# Patient Record
Sex: MALE | Employment: FULL TIME | URBAN - METROPOLITAN AREA
[De-identification: names, ages, dates, MRNs, and addresses within clinical notes are randomized per-mention and may not be internally consistent; named-entity substitution may affect disease eponyms.]

---

## 2020-01-20 ENCOUNTER — ED HISTORICAL/CONVERTED ENCOUNTER (OUTPATIENT)
Dept: OTHER | Age: 70
End: 2020-01-20

## 2021-11-02 NOTE — H&P
[facility]  HISTORY AND PHYSICAL    Name:  Valeri Montoya  MR#:  [id number]  :  1950  ACCOUNT #:  [de-identified]  ADMIT DATE:  2021    OFFICE VISIT/HISTORY AND PHYSICAL EXAMINATION ON 2021    Dear Dr. Palomo Hillman:    HISTORY OF PRESENT ILLNESS:  The patient is a very pleasant 78-year-old gentleman who is here for followup. He has coronary artery disease and had received a stent to very large right coronary artery in  and circumflex artery in . He has a history of hypertension and has continued to smoke. He also drinks, and he has dyslipidemia and mild degree of aortic stenosis. He had symptoms concerning for angina equivalent, and he underwent echocardiogram and Cardiolite stress test, and I explained the findings and these revealed evidence of ischemia of inferolateral wall with ejection fraction normal.  Considering this, decision is made to consider cardiac catheterization and possible angioplasty and stent insertion. Explaining procedure and risks, benefits including risk of vascular injury, contrast-related nephropathy, cardiac arrhythmias, need for transfusion, myocardial infarction, etc., and answered him all the questions in this regard. The patient understands and agrees. ALLERGIES:  NONE. PAST MEDICAL HISTORY:  Remarkable for history of coronary artery disease and stent insertion, hypertension, dyslipidemia, tobacco use, history of gout. MEDICATIONS:  The patient is on albuterol inhaler, allopurinol 100 mg daily, vitamin D3 supplement, fish oil, Imdur 60 mg daily, losartan 100 mg daily, multivitamin, sertraline 2 tablets daily, Spiriva, nitroglycerin 0.4 mg sublingual p.r.n., Toprol-XL 50 mg daily, and aspirin 81 mg daily. REVIEW OF SYSTEMS:  Significant for symptoms of chest pain, which is more associated with aggression and lasts about 10 minutes. Denied any nausea or vomiting. Denied any diabetes.   No history of hepatitis, hematuria, melena. He has some anxiety issue. No history of major depression or stroke or cancer. PHYSICAL EXAMINATION:  VITAL SIGNS:  The patient is 5 feet 4 inches tall, weighs 148 pounds, BMI 25.43. Blood pressure 153/101 today, but he is yet to take his medications and during last visit couple of weeks ago, it was 118/67, heart rate 71 beats per minute, and he is afebrile. SKIN:  Turgor is good. HEENT:  Unremarkable. NECK:  Jugular pressure is normal.  No carotid bruit. LUNGS:  Clear. No rales, rhonchi, or wheezing. HEART:  S1, S2, regular. No S3 or S4 gallop. Grade II/VI systolic ejection murmur at the base. ABDOMEN:  Soft. Muscle tone is good. No bruit. EXTREMITIES:  No edema. Peripheral pulses are normal.    IMPRESSION:  1. His symptoms of chest pain is concerning of angina. 2.  Coronary artery disease and stent insertion. 3.  History of stent insertion. 4.  Dyslipidemia. 5.  Tobacco use. PLAN:  We will proceed with cardiac catheterization and possible angioplasty and stent insertion. The patient understands the procedure and risks, benefits and agrees.         MD RAMONE [email or web address]  D:  11/01/2021 13:18  T:  11/02/2021 9:15  JOB #:  [id number]  CC:

## 2021-11-11 ENCOUNTER — HOSPITAL ENCOUNTER (OUTPATIENT)
Dept: PREADMISSION TESTING | Age: 71
Discharge: HOME | End: 2021-11-11
Payer: COMMERCIAL

## 2021-11-11 VITALS
TEMPERATURE: 98.2 F | WEIGHT: 148 LBS | HEIGHT: 65 IN | HEART RATE: 76 BPM | OXYGEN SATURATION: 98 % | BODY MASS INDEX: 24.66 KG/M2 | DIASTOLIC BLOOD PRESSURE: 69 MMHG | RESPIRATION RATE: 20 BRPM | SYSTOLIC BLOOD PRESSURE: 125 MMHG

## 2021-11-11 LAB
ANION GAP SERPL CALC-SCNC: 3 MMOL/L (ref 5–15)
BUN SERPL-MCNC: 17 MG/DL (ref 6–20)
BUN/CREAT SERPL: 20 (ref 12–20)
CA-I BLD-MCNC: 8.7 MG/DL (ref 8.5–10.1)
CHLORIDE SERPL-SCNC: 104 MMOL/L (ref 97–108)
CO2 SERPL-SCNC: 28 MMOL/L (ref 21–32)
CREAT SERPL-MCNC: 0.85 MG/DL (ref 0.7–1.3)
ERYTHROCYTE [DISTWIDTH] IN BLOOD BY AUTOMATED COUNT: 12.3 % (ref 11.5–14.5)
GLUCOSE SERPL-MCNC: 176 MG/DL (ref 65–100)
HCT VFR BLD AUTO: 41.2 % (ref 36.6–50.3)
HGB BLD-MCNC: 14 G/DL (ref 12.1–17)
MCH RBC QN AUTO: 30 PG (ref 26–34)
MCHC RBC AUTO-ENTMCNC: 34 G/DL (ref 30–36.5)
MCV RBC AUTO: 88.4 FL (ref 80–99)
NRBC # BLD: 0 K/UL (ref 0–0.01)
NRBC BLD-RTO: 0 PER 100 WBC
PLATELET # BLD AUTO: 192 K/UL (ref 150–400)
PMV BLD AUTO: 9.9 FL (ref 8.9–12.9)
POTASSIUM SERPL-SCNC: 3.8 MMOL/L (ref 3.5–5.1)
RBC # BLD AUTO: 4.66 M/UL (ref 4.1–5.7)
SODIUM SERPL-SCNC: 135 MMOL/L (ref 136–145)
WBC # BLD AUTO: 6.9 K/UL (ref 4.1–11.1)

## 2021-11-11 PROCEDURE — 85027 COMPLETE CBC AUTOMATED: CPT

## 2021-11-11 PROCEDURE — 93005 ELECTROCARDIOGRAM TRACING: CPT

## 2021-11-11 PROCEDURE — 36415 COLL VENOUS BLD VENIPUNCTURE: CPT

## 2021-11-11 PROCEDURE — 80048 BASIC METABOLIC PNL TOTAL CA: CPT

## 2021-11-11 RX ORDER — LANOLIN ALCOHOL/MO/W.PET/CERES
1000 CREAM (GRAM) TOPICAL DAILY
Status: ON HOLD | COMMUNITY
End: 2022-06-20

## 2021-11-11 RX ORDER — METOPROLOL SUCCINATE 50 MG/1
50 TABLET, EXTENDED RELEASE ORAL DAILY
COMMUNITY

## 2021-11-11 RX ORDER — BISMUTH SUBSALICYLATE 262 MG
1 TABLET,CHEWABLE ORAL DAILY
Status: ON HOLD | COMMUNITY
End: 2022-06-20

## 2021-11-11 RX ORDER — SPIRONOLACTONE 25 MG/1
25 TABLET ORAL DAILY
Status: ON HOLD | COMMUNITY
End: 2022-06-20

## 2021-11-11 RX ORDER — ALLOPURINOL 100 MG/1
100 TABLET ORAL DAILY
Status: ON HOLD | COMMUNITY
End: 2022-06-20

## 2021-11-11 RX ORDER — ALBUTEROL SULFATE 90 UG/1
2 AEROSOL, METERED RESPIRATORY (INHALATION) AS NEEDED
COMMUNITY

## 2021-11-11 RX ORDER — GLUCOSAMINE SULFATE 1500 MG
1000 POWDER IN PACKET (EA) ORAL DAILY
Status: ON HOLD | COMMUNITY
End: 2022-06-20

## 2021-11-11 RX ORDER — LANOLIN ALCOHOL/MO/W.PET/CERES
420 CREAM (GRAM) TOPICAL
Status: ON HOLD | COMMUNITY
End: 2022-06-20

## 2021-11-11 RX ORDER — LOSARTAN POTASSIUM 50 MG/1
50 TABLET ORAL DAILY
COMMUNITY

## 2021-11-11 RX ORDER — ISOSORBIDE MONONITRATE 120 MG/1
120 TABLET, EXTENDED RELEASE ORAL DAILY
COMMUNITY

## 2021-11-11 RX ORDER — BUDESONIDE AND FORMOTEROL FUMARATE DIHYDRATE 80; 4.5 UG/1; UG/1
2 AEROSOL RESPIRATORY (INHALATION) DAILY
COMMUNITY

## 2021-11-11 RX ORDER — ROSUVASTATIN CALCIUM 40 MG/1
20 TABLET, COATED ORAL DAILY
Status: ON HOLD | COMMUNITY
End: 2022-06-20

## 2021-11-11 NOTE — PERIOP NOTES
Called Mauro Rice at Dr. Olegario Lovelace office, made aware EKG showed HR 48, marked sinus bradycardia with PACs, states will make Dr. Monica Ramos aware and return call with any further orders.

## 2021-11-12 ENCOUNTER — HOSPITAL ENCOUNTER (OUTPATIENT)
Age: 71
Discharge: HOME | End: 2021-11-13
Attending: INTERNAL MEDICINE | Admitting: INTERNAL MEDICINE
Payer: COMMERCIAL

## 2021-11-12 DIAGNOSIS — R94.39 ABNORMAL STRESS TEST: ICD-10-CM

## 2021-11-12 PROBLEM — R07.9 CHEST PAIN: Status: ACTIVE | Noted: 2021-11-12

## 2021-11-12 PROBLEM — I25.10 CAD (CORONARY ARTERY DISEASE): Status: ACTIVE | Noted: 2021-11-12

## 2021-11-12 LAB
ACT BLD: 287 SEC (ref 74–125)
ATRIAL RATE: 48 BPM
CALCULATED P AXIS, ECG09: 68 DEGREES
CALCULATED R AXIS, ECG10: 54 DEGREES
CALCULATED T AXIS, ECG11: 59 DEGREES
DIAGNOSIS, 93000: NORMAL
P-R INTERVAL, ECG05: 148 MS
PERFORMED BY, TECHID: ABNORMAL
Q-T INTERVAL, ECG07: 434 MS
QRS DURATION, ECG06: 78 MS
QTC CALCULATION (BEZET), ECG08: 387 MS
VENTRICULAR RATE, ECG03: 48 BPM

## 2021-11-12 PROCEDURE — 74011000250 HC RX REV CODE- 250: Performed by: INTERNAL MEDICINE

## 2021-11-12 PROCEDURE — 93458 L HRT ARTERY/VENTRICLE ANGIO: CPT | Performed by: INTERNAL MEDICINE

## 2021-11-12 PROCEDURE — 2709999900 HC NON-CHARGEABLE SUPPLY: Performed by: INTERNAL MEDICINE

## 2021-11-12 PROCEDURE — 99152 MOD SED SAME PHYS/QHP 5/>YRS: CPT | Performed by: INTERNAL MEDICINE

## 2021-11-12 PROCEDURE — C1760 CLOSURE DEV, VASC: HCPCS | Performed by: INTERNAL MEDICINE

## 2021-11-12 PROCEDURE — 74011250636 HC RX REV CODE- 250/636: Performed by: INTERNAL MEDICINE

## 2021-11-12 PROCEDURE — C1887 CATHETER, GUIDING: HCPCS | Performed by: INTERNAL MEDICINE

## 2021-11-12 PROCEDURE — C1769 GUIDE WIRE: HCPCS | Performed by: INTERNAL MEDICINE

## 2021-11-12 PROCEDURE — 77030004550 HC CATH ANGI DX PRF MRTM -B: Performed by: INTERNAL MEDICINE

## 2021-11-12 PROCEDURE — 77030029065 HC DRSG HEMO QCLOT ZMED -B: Performed by: INTERNAL MEDICINE

## 2021-11-12 PROCEDURE — 76210000001 HC OR PH I REC 2.5 TO 3 HR: Performed by: INTERNAL MEDICINE

## 2021-11-12 PROCEDURE — 74011000636 HC RX REV CODE- 636: Performed by: INTERNAL MEDICINE

## 2021-11-12 PROCEDURE — 99153 MOD SED SAME PHYS/QHP EA: CPT | Performed by: INTERNAL MEDICINE

## 2021-11-12 PROCEDURE — 77030019698 HC SYR ANGI MDLON MRTM -A: Performed by: INTERNAL MEDICINE

## 2021-11-12 PROCEDURE — 92928 PRQ TCAT PLMT NTRAC ST 1 LES: CPT | Performed by: INTERNAL MEDICINE

## 2021-11-12 PROCEDURE — C1874 STENT, COATED/COV W/DEL SYS: HCPCS | Performed by: INTERNAL MEDICINE

## 2021-11-12 PROCEDURE — C1894 INTRO/SHEATH, NON-LASER: HCPCS | Performed by: INTERNAL MEDICINE

## 2021-11-12 PROCEDURE — 85347 COAGULATION TIME ACTIVATED: CPT

## 2021-11-12 PROCEDURE — 74011250637 HC RX REV CODE- 250/637: Performed by: INTERNAL MEDICINE

## 2021-11-12 DEVICE — STENT COR DES 3.00X30MM -- DES RESOLUTE ONYX: Type: IMPLANTABLE DEVICE | Status: FUNCTIONAL

## 2021-11-12 RX ORDER — SODIUM CHLORIDE 9 MG/ML
125 INJECTION, SOLUTION INTRAVENOUS CONTINUOUS
Status: DISCONTINUED | OUTPATIENT
Start: 2021-11-12 | End: 2021-11-13 | Stop reason: HOSPADM

## 2021-11-12 RX ORDER — LIDOCAINE HYDROCHLORIDE 10 MG/ML
INJECTION INFILTRATION; PERINEURAL AS NEEDED
Status: DISCONTINUED | OUTPATIENT
Start: 2021-11-12 | End: 2021-11-12 | Stop reason: HOSPADM

## 2021-11-12 RX ORDER — SODIUM CHLORIDE 0.9 % (FLUSH) 0.9 %
5-40 SYRINGE (ML) INJECTION EVERY 8 HOURS
Status: CANCELLED | OUTPATIENT
Start: 2021-11-12

## 2021-11-12 RX ORDER — HEPARIN SODIUM 200 [USP'U]/100ML
INJECTION, SOLUTION INTRAVENOUS
Status: COMPLETED | OUTPATIENT
Start: 2021-11-12 | End: 2021-11-12

## 2021-11-12 RX ORDER — MIDAZOLAM HYDROCHLORIDE 1 MG/ML
INJECTION INTRAMUSCULAR; INTRAVENOUS AS NEEDED
Status: DISCONTINUED | OUTPATIENT
Start: 2021-11-12 | End: 2021-11-12 | Stop reason: HOSPADM

## 2021-11-12 RX ORDER — GUAIFENESIN 100 MG/5ML
81 LIQUID (ML) ORAL DAILY
Status: DISCONTINUED | OUTPATIENT
Start: 2021-11-13 | End: 2021-11-13 | Stop reason: HOSPADM

## 2021-11-12 RX ORDER — HEPARIN SODIUM 1000 [USP'U]/ML
INJECTION, SOLUTION INTRAVENOUS; SUBCUTANEOUS AS NEEDED
Status: DISCONTINUED | OUTPATIENT
Start: 2021-11-12 | End: 2021-11-12 | Stop reason: HOSPADM

## 2021-11-12 RX ORDER — SODIUM CHLORIDE 0.9 % (FLUSH) 0.9 %
5-40 SYRINGE (ML) INJECTION AS NEEDED
Status: CANCELLED | OUTPATIENT
Start: 2021-11-12

## 2021-11-12 RX ORDER — GUAIFENESIN 100 MG/5ML
LIQUID (ML) ORAL AS NEEDED
Status: DISCONTINUED | OUTPATIENT
Start: 2021-11-12 | End: 2021-11-12 | Stop reason: HOSPADM

## 2021-11-12 RX ORDER — LOSARTAN POTASSIUM 50 MG/1
100 TABLET ORAL DAILY
Status: DISCONTINUED | OUTPATIENT
Start: 2021-11-13 | End: 2021-11-13 | Stop reason: HOSPADM

## 2021-11-12 RX ORDER — ATORVASTATIN CALCIUM 40 MG/1
40 TABLET, FILM COATED ORAL
Status: DISCONTINUED | OUTPATIENT
Start: 2021-11-12 | End: 2021-11-13 | Stop reason: HOSPADM

## 2021-11-12 RX ORDER — DIPHENHYDRAMINE HYDROCHLORIDE 50 MG/ML
INJECTION, SOLUTION INTRAMUSCULAR; INTRAVENOUS AS NEEDED
Status: DISCONTINUED | OUTPATIENT
Start: 2021-11-12 | End: 2021-11-12 | Stop reason: HOSPADM

## 2021-11-12 RX ORDER — ISOSORBIDE MONONITRATE 60 MG/1
60 TABLET, EXTENDED RELEASE ORAL DAILY
Status: DISCONTINUED | OUTPATIENT
Start: 2021-11-12 | End: 2021-11-13 | Stop reason: HOSPADM

## 2021-11-12 RX ORDER — FENTANYL CITRATE 50 UG/ML
INJECTION, SOLUTION INTRAMUSCULAR; INTRAVENOUS AS NEEDED
Status: DISCONTINUED | OUTPATIENT
Start: 2021-11-12 | End: 2021-11-12 | Stop reason: HOSPADM

## 2021-11-12 RX ADMIN — SODIUM CHLORIDE 125 ML/HR: 9 INJECTION, SOLUTION INTRAVENOUS at 08:21

## 2021-11-12 RX ADMIN — ATORVASTATIN CALCIUM 40 MG: 40 TABLET, FILM COATED ORAL at 22:19

## 2021-11-12 RX ADMIN — SODIUM CHLORIDE 125 ML/HR: 9 INJECTION, SOLUTION INTRAVENOUS at 15:00

## 2021-11-12 RX ADMIN — TICAGRELOR 90 MG: 90 TABLET ORAL at 18:50

## 2021-11-12 NOTE — PROGRESS NOTES
Provided patient education about and explained the importance of medication compliance. Patient was given Brilinta savings kit. Patient was advised that if the antiplatelet medication becomes unaffordable, he must notify the cardiologist immediately so that an alternate plan for antiplatelet therapy can be made. Patient stated that he will fill this prescription before or upon discharge so that it will be available for the next scheduled dose after discharge. Patient asked appropriate questions and verbalized understanding during this consultation and was given printed teaching materials and my contact information in case I can provide further assistance. Spoke with patient's wife in the waiting area as well. Patient's wife asked that Brilinta prescription be called in/sent to [facility] in [place] in [place] so that she can pick it up prior to the patient's discharge. I did relay this information to Dr. Margie Garcia while he was still in the cardiac cath lab. He verbalized understanding. Phase 2 outpatient cardiac rehab referral is not appropriate for this patient at this time per Dr. Margie Garcia.

## 2021-11-12 NOTE — Clinical Note
TRANSFER - OUT REPORT:     Verbal report given to: Cristy, (at bedside). Report consisted of patient's Situation, Background, Assessment and   Recommendations(SBAR). Opportunity for questions and clarification was provided. Patient transported with a Registered Nurse and Monitor. Patient transported to: recovery.

## 2021-11-12 NOTE — ROUTINE PROCESS
TRANSFER - OUT REPORT:    Verbal report given to Maria Fernanda on [address]  being transferred to [facility] for routine progression of care       Report consisted of patients Situation, Background, Assessment and   Recommendations(SBAR). Information from the following report(s) Procedure Summary was reviewed with the receiving nurse. Opportunity for questions and clarification was provided.       Patient transported with:   Monitor  Registered Nurse

## 2021-11-12 NOTE — Clinical Note
Contrast Dose Calculator:   Patient's age: 72.   Patient's sex: Male. Patient weight (kg) = 67. Creatinine level (mg/dL) = 0.85. Creatinine clearance (mL/min): 78.   Contrast concentration (mg/mL) = 370. MACD = 300 mL. Max Contrast dose per Creatinine Cl calculator = 175.5 mL.

## 2021-11-12 NOTE — PROGRESS NOTES
Recvd from PACU in room 478. PT has compresion Bandage on Rt groin  laying flat 20 Degrees until 1730 today. No  C/o pain a this time.   Will check vitals and constinue to monitor

## 2021-11-13 VITALS
SYSTOLIC BLOOD PRESSURE: 154 MMHG | HEART RATE: 62 BPM | OXYGEN SATURATION: 98 % | DIASTOLIC BLOOD PRESSURE: 72 MMHG | RESPIRATION RATE: 20 BRPM | TEMPERATURE: 98.2 F

## 2021-11-13 PROCEDURE — 74011250637 HC RX REV CODE- 250/637: Performed by: INTERNAL MEDICINE

## 2021-11-13 PROCEDURE — 74011250636 HC RX REV CODE- 250/636: Performed by: INTERNAL MEDICINE

## 2021-11-13 RX ADMIN — ASPIRIN 81 MG CHEWABLE TABLET 81 MG: 81 TABLET CHEWABLE at 10:39

## 2021-11-13 RX ADMIN — ISOSORBIDE MONONITRATE 60 MG: 60 TABLET, EXTENDED RELEASE ORAL at 10:39

## 2021-11-13 RX ADMIN — LOSARTAN POTASSIUM 100 MG: 50 TABLET, FILM COATED ORAL at 10:39

## 2021-11-13 RX ADMIN — SODIUM CHLORIDE 125 ML/HR: 9 INJECTION, SOLUTION INTRAVENOUS at 04:00

## 2021-11-13 RX ADMIN — TICAGRELOR 90 MG: 90 TABLET ORAL at 10:39

## 2021-11-13 NOTE — PROGRESS NOTES
Recvd pt from [facility], Pt resting well at this, no c/o pain.  Rt groin dry and intact, no bleeding shown, Pulses +2 DP, will continue to monitor

## 2021-11-13 NOTE — PROGRESS NOTES
Progress Note      11/13/2021 12:26 PM  NAME: Maile Murillo   MRN:  [id number]   Admit Diagnosis: Abnormal stress test [R94.39]  CAD (coronary artery disease) [I25.10]  Chest pain [R07.9]      Subjective:   Chart reviewed. Discussed with the wife about her coronary angiography and stent insertion report and also explained to the patient. Patient feels good. Denies any chest pain or shortness of breath. Review of Systems:    Symptom Y/N Comments  Symptom Y/N Comments   Fever/Chills n   Chest Pain n    Poor Appetite    Edema     Cough    Abdominal Pain n    Sputum    Joint Pain     SOB/LEWIS n   Pruritis/Rash     Nausea/vomit    Other     Diarrhea         Constipation           Could NOT obtain due to:      Objective:        Physical Exam:    Last 24hrs VS reviewed since prior progress note. Most recent are:    Visit Vitals  BP (!) 154/72 (BP 1 Location: Right upper arm, BP Patient Position: Lying)   Pulse 62   Temp 98.2 °F (36.8 °C)   Resp 20   SpO2 98%     No intake or output data in the 24 hours ending 11/13/21 1226     General Appearance: Well developed, well nourished, alert & oriented x 3,    no acute distress. Ears/Nose/Mouth/Throat: Hearing grossly normal.  Neck: Supple. Chest: Lungs clear to auscultation bilaterally. Cardiovascular: Regular rate and rhythm, S1,S2 normal, no murmur. Abdomen: Soft, non-tender, bowel sounds are active. Extremities: No edema bilaterally. Skin: Warm and dry. [x]         Post-cath site without hematoma, bruit, tenderness, or thrill. Distal pulses intact. PMH/ reviewed - no change compared to H&P    Data Review    Telemetry: normal sinus rhythm     EKG:   []  No new EKG for review    Lab Data Personally Reviewed:    Recent Labs     11/11/21  1020   WBC 6.9   HGB 14.0   HCT 41.2        No results for input(s): INR, PTP, APTT, INREXT in the last 72 hours.    Recent Labs     11/11/21  1020   *   K 3.8      CO2 28   BUN 17   CREA 0.85   GLU 176*   CA 8.7     No results for input(s): CPK, CKNDX, TROIQ in the last 72 hours. No lab exists for component: CPKMB  No results found for: CHOL, CHOLX, CHLST, CHOLV, HDL, HDLP, LDL, LDLC, DLDLP, TGLX, TRIGL, TRIGP, CHHD, CHHDX    No results for input(s): AP, TBIL, TP, ALB, GLOB, GGT, AML, LPSE in the last 72 hours. No lab exists for component: SGOT, GPT, AMYP, HLPSE  No results for input(s): PH, PCO2, PO2 in the last 72 hours. Medications Personally Reviewed:    Current Facility-Administered Medications   Medication Dose Route Frequency    0.9% sodium chloride infusion  125 mL/hr IntraVENous CONTINUOUS    aspirin chewable tablet 81 mg  81 mg Oral DAILY    ticagrelor (BRILINTA) tablet 90 mg  90 mg Oral Q12H    losartan (COZAAR) tablet 100 mg  100 mg Oral DAILY    isosorbide mononitrate ER (IMDUR) tablet 60 mg  60 mg Oral DAILY    atorvastatin (LIPITOR) tablet 40 mg  40 mg Oral QHS           Problem List:   Symptoms of unstable angina and patient was found to have a critical stenosis of the right coronary artery and received a stent with angiographically excellent results. Previously deployed stent to distal right coronary artery and circumflex artery are functioning very well. Hypertension. Dyslipidemia. Tobacco use. 1.      Assessment/Plan:   Advised patient to not to smoke. I will continue dual antiplatelet medications and will follow him in the office in next 1 to 2 weeks and discharge him. Thank you. 1.          []       High complexity decision making was performed in this patient at high risk for decompensation with multiple organ involvement.     Zoie Griffin MD

## 2021-11-13 NOTE — PROGRESS NOTES
Problem: Falls - Risk of  Goal: *Absence of Falls  Description: Document [place] Led Fall Risk and appropriate interventions in the flowsheet.   Outcome: Progressing Towards Goal  Note: Fall Risk Interventions:            Medication Interventions: Teach patient to arise slowly                   Problem: Patient Education: Go to Patient Education Activity  Goal: Patient/Family Education  Outcome: Progressing Towards Goal

## 2021-11-13 NOTE — PROGRESS NOTES
1330 D/C patient after giving stent card to pt and expling to always have that in his wallet, and warring signs ot when to call the MD.  Bandage removed, no bleeding, dry/intact. Replaced with guaze and tape. No c/o pain at this time.  Follow up appointment scheduled with MD

## 2022-01-04 ENCOUNTER — HOSPITAL ENCOUNTER (EMERGENCY)
Age: 72
Discharge: HOME | End: 2022-01-04
Attending: EMERGENCY MEDICINE
Payer: COMMERCIAL

## 2022-01-04 ENCOUNTER — APPOINTMENT (OUTPATIENT)
Dept: GENERAL RADIOLOGY | Age: 72
End: 2022-01-04
Attending: EMERGENCY MEDICINE
Payer: COMMERCIAL

## 2022-01-04 VITALS
HEART RATE: 68 BPM | BODY MASS INDEX: 25.27 KG/M2 | RESPIRATION RATE: 24 BRPM | HEIGHT: 64 IN | DIASTOLIC BLOOD PRESSURE: 79 MMHG | SYSTOLIC BLOOD PRESSURE: 165 MMHG | TEMPERATURE: 97.6 F | WEIGHT: 148 LBS | OXYGEN SATURATION: 96 %

## 2022-01-04 DIAGNOSIS — J44.1 ACUTE EXACERBATION OF CHRONIC OBSTRUCTIVE PULMONARY DISEASE (COPD) (HCC): ICD-10-CM

## 2022-01-04 DIAGNOSIS — R06.02 SOB (SHORTNESS OF BREATH): Primary | ICD-10-CM

## 2022-01-04 LAB
COVID-19 RAPID TEST, COVR: NOT DETECTED
SPECIMEN SOURCE: NORMAL

## 2022-01-04 PROCEDURE — 74011250637 HC RX REV CODE- 250/637: Performed by: EMERGENCY MEDICINE

## 2022-01-04 PROCEDURE — 74011636637 HC RX REV CODE- 636/637: Performed by: EMERGENCY MEDICINE

## 2022-01-04 PROCEDURE — 71045 X-RAY EXAM CHEST 1 VIEW: CPT

## 2022-01-04 PROCEDURE — 99284 EMERGENCY DEPT VISIT MOD MDM: CPT

## 2022-01-04 PROCEDURE — 93005 ELECTROCARDIOGRAM TRACING: CPT

## 2022-01-04 PROCEDURE — 94640 AIRWAY INHALATION TREATMENT: CPT

## 2022-01-04 PROCEDURE — 87635 SARS-COV-2 COVID-19 AMP PRB: CPT

## 2022-01-04 RX ORDER — PREDNISONE 20 MG/1
40 TABLET ORAL DAILY
Qty: 8 TABLET | Refills: 0 | Status: SHIPPED | OUTPATIENT
Start: 2022-01-04 | End: 2022-01-08

## 2022-01-04 RX ORDER — PREDNISONE 20 MG/1
60 TABLET ORAL
Status: DISCONTINUED | OUTPATIENT
Start: 2022-01-05 | End: 2022-01-04

## 2022-01-04 RX ORDER — PREDNISONE 20 MG/1
60 TABLET ORAL
Status: COMPLETED | OUTPATIENT
Start: 2022-01-04 | End: 2022-01-04

## 2022-01-04 RX ORDER — ALBUTEROL SULFATE 90 UG/1
4 AEROSOL, METERED RESPIRATORY (INHALATION)
Status: COMPLETED | OUTPATIENT
Start: 2022-01-04 | End: 2022-01-04

## 2022-01-04 RX ADMIN — ALBUTEROL SULFATE 4 PUFF: 108 INHALANT RESPIRATORY (INHALATION) at 18:22

## 2022-01-04 RX ADMIN — PREDNISONE 60 MG: 20 TABLET ORAL at 17:56

## 2022-01-04 NOTE — ED PROVIDER NOTES
EMERGENCY DEPARTMENT HISTORY AND PHYSICAL EXAM      Date: 1/4/2022  Patient Name: Stiven Solitario      History of Presenting Illness     Chief Complaint   Patient presents with    Shortness of Breath       History Provided By: Patient    HPI: Stiven Solitario, 80 y.o. male with a past medical history significant for COPD, CAD s/p stent 11/2021, HTN, HLD presents to the ED with cc of SOB. Had acute onset SOB x30min when he went home from work today. Has had cough for weeks. Denies F/C/N/V/D, CP, LE edema. Denies abd pain, diarrhea, melena, hematochezia. States his SOB resolved upon arriving to ER but still coughing. Denies COVID contacts. There are no other complaints, changes, or physical findings at this time. PCP: Luda Fletcher MD    Current Facility-Administered Medications   Medication Dose Route Frequency Provider Last Rate Last Admin    albuterol (PROVENTIL HFA, VENTOLIN HFA, PROAIR HFA) inhaler 4 Puff  4 Puff Inhalation NOW Garry Mendoza MD        predniSONE (DELTASONE) tablet 60 mg  60 mg Oral NOW Garry Mendoza MD         Current Outpatient Medications   Medication Sig Dispense Refill    albuterol (ProAir HFA) 90 mcg/actuation inhaler Take 2 Puffs by inhalation as needed for Wheezing or Cough.  budesonide-formoteroL (Symbicort) 80-4.5 mcg/actuation HFAA Take 2 Puffs by inhalation daily.  tiotropium bromide (Spiriva Respimat) 2.5 mcg/actuation inhaler Take 2 Puffs by inhalation daily.  allopurinoL (ZYLOPRIM) 100 mg tablet Take 100 mg by mouth daily.  cyanocobalamin (VITAMIN B12) 500 mcg tablet Take 1,000 mcg by mouth daily.  metoprolol succinate (TOPROL-XL) 50 mg XL tablet Take 50 mg by mouth daily.  multivitamin (ONE A DAY) tablet Take 1 Tablet by mouth daily.  rosuvastatin (CRESTOR) 40 mg tablet Take 20 mg by mouth daily.  spironolactone (ALDACTONE) 25 mg tablet Take 25 mg by mouth daily.       magnesium oxide 420 mg tab Take 420 mg by mouth daily as needed (leg cramps).  losartan (COZAAR) 50 mg tablet Take 50 mg by mouth daily.  cholecalciferol (VITAMIN D3) 25 mcg (1,000 unit) cap Take 1,000 Units by mouth daily. 2 tablets      omega-3 fatty acids/fish oil (FISH OIL OMEGA 3-6-9 PO) Take  by mouth daily.  isosorbide mononitrate ER (IMDUR) 120 mg CR tablet Take 120 mg by mouth daily. 1/2 tablet      sertraline HCl (SERTRALINE PO) Take  by mouth daily. Past History     Past Medical History:  Past Medical History:   Diagnosis Date    CAD (coronary artery disease)     2 stents    Chest pain     States occasional CP x1 month    Chronic obstructive pulmonary disease (HCC)     History of blood transfusion     States from bleeding in stomach, from Plavix    Hypercholesteremia     Hypertension     Multiple lung nodules     Recent finding, follow up in 6 months    Pancreatitis     History of    PTSD (post-traumatic stress disorder)     Sleep apnea     no longer uses CPAP       Past Surgical History:  Past Surgical History:   Procedure Laterality Date    HX COLONOSCOPY      MI CARDIAC SURG PROCEDURE UNLIST      Cardiac cath, 2 stents       Family History:  Family History   Problem Relation Age of Onset    Diabetes Mother     Diabetes Father     Diabetes Sister     Diabetes Brother     Stroke Brother     Kidney Disease Brother        Social History:  Social History     Tobacco Use    Smoking status: Current Every Day Smoker    Smokeless tobacco: Never Used    Tobacco comment: Trying to quit, 5-6 cigarettes a day   Substance Use Topics    Alcohol use: Yes     Comment: 2-3 beers daily    Drug use: Never       Allergies:  No Known Allergies      Review of Systems   Constitutional: Negative except as in HPI. Eyes: Negative except as in HPI.  ENT: Negative except as in HPI. Cardiovascular: Negative except as in HPI. Respiratory: Negative except as in HPI. Gastrointestinal: Negative except as in HPI.   Genitourinary: Negative except as in HPI. Musculoskeletal: Negative except as in HPI. Integumentary: Negative except as in HPI. Neurological: Negative except as in HPI. Psychiatric: Negative except as in HPI. Endocrine: Negative except as in HPI. Hematologic/Lymphatic: Negative except as in HPI. Allergic/Immunologic: Negative except as in HPI. Physical Exam   Constitutional: Awake and alert, interactive, NAD  Eyes: PERRL, no injection or scleral icterus, no discharge  HEENT: NCAT, neck supple, MMM, no oropharyngeal exudates  CV: RRR, no m/r/g  Respiratory: Faint wheezing throughout, no respiratory distress or accessory muscle usage  GI: Abd soft, nondistended, nontender  : Deferred  MSK: FROM, no joint effusions or edema  Skin: No rashes  Neuro: CN2-12 intact, symmetric facies, fluent speech. Psych: Well-groomed, normal speech, behavior, appropriate mood    Lab and Diagnostic Study Results     Labs -   No results found for this or any previous visit (from the past 12 hour(s)). Radiologic Studies -   [unfilled]  CT Results  (Last 48 hours)    None        CXR Results  (Last 48 hours)               01/04/22 1735  XR CHEST SNGL V Final result    Narrative:  Chest single view. Comparison chest series January 20, 2020. Unchanged appearance for the lungs. No interstitial or alveolar pulmonary edema. Cardiac and mediastinal structures unchanged. Suspect great vessel ectasia   similar compared to prior imaging. Hypertrophic bone first costosternal margins. No pneumothorax or sizable pleural effusion. Nonacute left clavicle fracture. Medical Decision Making and ED Course   - I am the first and primary provider for this patient AND AM THE PRIMARY PROVIDER OF RECORD. - I reviewed the vital signs, available nursing notes, past medical history, past surgical history, family history and social history. - Initial assessment performed.  The patients presenting problems have been discussed, and the staff are in agreement with the care plan formulated and outlined with them. I have encouraged them to ask questions as they arise throughout their visit. Vital Signs-Reviewed the patient's vital signs. Patient Vitals for the past 12 hrs:   Temp Pulse Resp BP SpO2   01/04/22 1710 97.6 °F (36.4 °C) 66 28 (!) 169/88 95 %       EKG interpretation: [email or web address], nl QRS/QTc/axis, no CHARITY/STD or nonanatomic TWI. Good R-wave progression. Similar to prior EKG 11/2021. BSUS: No percardial effusion, good squeeze, no B-lines. Provider Notes (Medical Decision Making):   71M w/SOB c/w COPD exacerbation. No CP to suggest ACS but will get EKG to eval. Prednisone, albuterol to treat and will reassess. Patient already feels improved from episode, not in extremis but visibly having difficulty taking deep breaths on exam.    ED Course:       ED Course as of 01/04/22 1913   Tue Jan 04, 2022   1848 XR CHEST SNGL V    Comparison chest series January 20, 2020.     Unchanged appearance for the lungs. No interstitial or alveolar pulmonary edema.     Cardiac and mediastinal structures unchanged. Suspect great vessel ectasia  similar compared to prior imaging. Hypertrophic bone first costosternal margins.     No pneumothorax or sizable pleural effusion. Nonacute left clavicle fracture. [YA]   [id number] Patient feels significantly improved, no wheezing on lung exam now. Will discharge with prednisone and return precautions. [YA]      ED Course User Index  [YA] Kemal Fulton MD       Disposition     Disposition: DC- Adult Discharges: All of the diagnostic tests were reviewed and questions answered. Diagnosis, care plan and treatment options were discussed. The patient understands the instructions and will follow up as directed. The patients results have been reviewed with them. They have been counseled regarding their diagnosis.   The patient verbally convey understanding and agreement of the signs, symptoms, diagnosis, treatment and prognosis and additionally agrees to follow up as recommended with their PCP in 24 - 48 hours. They also agree with the care-plan and convey that all of their questions have been answered. I have also put together some discharge instructions for them that include: 1) educational information regarding their diagnosis, 2) how to care for their diagnosis at home, as well a 3) list of reasons why they would want to return to the ED prior to their follow-up appointment, should their condition change. Discharged      Diagnosis     Clinical Impression:   1. SOB (shortness of breath)        Attestations:     Michael Sargent MD

## 2022-01-05 NOTE — DISCHARGE INSTRUCTIONS
You were seen in the ER for your difficulty breathing. Thankfully, your COVID test is negative and we did not see any pneumonia on your chest x-ray and your EKG did not show signs of a heart attack. This is likely from your COPD. You should take steroids (prednisone) for the next 4 days and use the albuterol inhaler as needed up to every 4 hours. Follow up with your primary care doctor in the next week to make sure your symptoms are getting better. If your breathing is getting worse, or if you are having chest pain or if you need to use your inhaler more often than every 4 hours, please return to the emergency department immediately. Thank you! Thank you for allowing me to care for you in the emergency department. I sincerely hope that you are satisfied with your visit today. It is my goal to provide you with excellent care. Below you will find a list of your labs and imaging from your visit today. Should you have any questions regarding these results please do not hesitate to call the emergency department. Labs -     Recent Results (from the past 12 hour(s))   COVID-19 RAPID TEST    Collection Time: 01/04/22  5:20 PM   Result Value Ref Range    Specimen source Please find results under separate order      COVID-19 rapid test Not Detected Not Detected         Radiologic Studies -   XR CHEST SNGL V   Final Result        CT Results  (Last 48 hours)      None          CXR Results  (Last 48 hours)                 01/04/22 1735  XR CHEST SNGL V Final result    Narrative:  Chest single view. Comparison chest series January 20, 2020. Unchanged appearance for the lungs. No interstitial or alveolar pulmonary edema. Cardiac and mediastinal structures unchanged. Suspect great vessel ectasia   similar compared to prior imaging. Hypertrophic bone first costosternal margins. No pneumothorax or sizable pleural effusion. Nonacute left clavicle fracture.                   If you feel that you have not received excellent quality care or timely care, please ask to speak to the nurse manager. Please choose us in the future for your continued health care needs. ------------------------------------------------------------------------------------------------------------  The exam and treatment you received in the Emergency Department were for an urgent problem and are not intended as complete care. It is important that you follow-up with a doctor, nurse practitioner, or physician assistant to:  (1) confirm your diagnosis,  (2) re-evaluation of changes in your illness and treatment, and  (3) for ongoing care. If your symptoms become worse or you do not improve as expected and you are unable to reach your usual health care provider, you should return to the Emergency Department. We are available 24 hours a day. Please take your discharge instructions with you when you go to your follow-up appointment. If you have any problem arranging a follow-up appointment, contact the Emergency Department immediately. If a prescription has been provided, please have it filled as soon as possible to prevent a delay in treatment. Read the entire medication instruction sheet provided to you by the pharmacy. If you have any questions or reservations about taking the medication due to side effects or interactions with other medications, please call your primary care physician or contact the ER to speak with the charge nurse. Make an appointment with your family doctor or the physician you were referred to for follow-up of this visit as instructed on your discharge paperwork, as this is a mandatory follow-up. Return to the ER if you are unable to be seen or if you are unable to be seen in a timely manner. If you have any problem arranging the follow-up visit, contact the Emergency Department immediately.

## 2022-01-07 LAB
ATRIAL RATE: 58 BPM
CALCULATED P AXIS, ECG09: 83 DEGREES
CALCULATED R AXIS, ECG10: 65 DEGREES
CALCULATED T AXIS, ECG11: 65 DEGREES
DIAGNOSIS, 93000: NORMAL
P-R INTERVAL, ECG05: 168 MS
Q-T INTERVAL, ECG07: 416 MS
QRS DURATION, ECG06: 72 MS
QTC CALCULATION (BEZET), ECG08: 408 MS
VENTRICULAR RATE, ECG03: 58 BPM

## 2022-03-19 PROBLEM — I25.10 CAD (CORONARY ARTERY DISEASE): Status: ACTIVE | Noted: 2021-11-12

## 2022-03-20 PROBLEM — R07.9 CHEST PAIN: Status: ACTIVE | Noted: 2021-11-12

## 2022-06-19 ENCOUNTER — HOSPITAL ENCOUNTER (INPATIENT)
Age: 72
LOS: 4 days | Discharge: HOME | DRG: 189 | End: 2022-06-23
Attending: EMERGENCY MEDICINE | Admitting: INTERNAL MEDICINE
Payer: COMMERCIAL

## 2022-06-19 ENCOUNTER — APPOINTMENT (OUTPATIENT)
Dept: GENERAL RADIOLOGY | Age: 72
DRG: 189 | End: 2022-06-19
Attending: EMERGENCY MEDICINE
Payer: COMMERCIAL

## 2022-06-19 DIAGNOSIS — J44.1 COPD EXACERBATION (HCC): Primary | ICD-10-CM

## 2022-06-19 LAB
ALBUMIN SERPL-MCNC: 4.4 G/DL (ref 3.5–5)
ALBUMIN/GLOB SERPL: 1.3 {RATIO} (ref 1.1–2.2)
ALP SERPL-CCNC: 49 U/L (ref 45–117)
ALT SERPL-CCNC: 25 U/L (ref 12–78)
ANION GAP SERPL CALC-SCNC: 7 MMOL/L (ref 5–15)
AST SERPL W P-5'-P-CCNC: 24 U/L (ref 15–37)
ATRIAL RATE: 85 BPM
BASOPHILS # BLD: 0.1 K/UL (ref 0–0.1)
BASOPHILS NFR BLD: 0 % (ref 0–1)
BILIRUB SERPL-MCNC: 1.1 MG/DL (ref 0.2–1)
BUN SERPL-MCNC: 22 MG/DL (ref 6–20)
BUN/CREAT SERPL: 18 (ref 12–20)
CA-I BLD-MCNC: 9.2 MG/DL (ref 8.5–10.1)
CALCULATED P AXIS, ECG09: 80 DEGREES
CALCULATED R AXIS, ECG10: 80 DEGREES
CALCULATED T AXIS, ECG11: 106 DEGREES
CHLORIDE SERPL-SCNC: 100 MMOL/L (ref 97–108)
CO2 SERPL-SCNC: 25 MMOL/L (ref 21–32)
CREAT SERPL-MCNC: 1.23 MG/DL (ref 0.7–1.3)
DIAGNOSIS, 93000: NORMAL
DIFFERENTIAL METHOD BLD: ABNORMAL
EOSINOPHIL # BLD: 0.3 K/UL (ref 0–0.4)
EOSINOPHIL NFR BLD: 2 % (ref 0–7)
ERYTHROCYTE [DISTWIDTH] IN BLOOD BY AUTOMATED COUNT: 12.5 % (ref 11.5–14.5)
GLOBULIN SER CALC-MCNC: 3.5 G/DL (ref 2–4)
GLUCOSE SERPL-MCNC: 125 MG/DL (ref 65–100)
HCT VFR BLD AUTO: 42.8 % (ref 36.6–50.3)
HGB BLD-MCNC: 14.9 G/DL (ref 12.1–17)
IMM GRANULOCYTES # BLD AUTO: 0.1 K/UL (ref 0–0.04)
IMM GRANULOCYTES NFR BLD AUTO: 1 % (ref 0–0.5)
LYMPHOCYTES # BLD: 1 K/UL (ref 0.8–3.5)
LYMPHOCYTES NFR BLD: 6 % (ref 12–49)
MCH RBC QN AUTO: 30.1 PG (ref 26–34)
MCHC RBC AUTO-ENTMCNC: 34.8 G/DL (ref 30–36.5)
MCV RBC AUTO: 86.5 FL (ref 80–99)
MONOCYTES # BLD: 1 K/UL (ref 0–1)
MONOCYTES NFR BLD: 6 % (ref 5–13)
NEUTS SEG # BLD: 13.8 K/UL (ref 1.8–8)
NEUTS SEG NFR BLD: 85 % (ref 32–75)
NRBC # BLD: 0 K/UL (ref 0–0.01)
NRBC BLD-RTO: 0 PER 100 WBC
P-R INTERVAL, ECG05: 158 MS
PLATELET # BLD AUTO: 210 K/UL (ref 150–400)
PMV BLD AUTO: 9.7 FL (ref 8.9–12.9)
POTASSIUM SERPL-SCNC: 4.5 MMOL/L (ref 3.5–5.1)
PROCALCITONIN SERPL-MCNC: <0.05 NG/ML
PROT SERPL-MCNC: 7.9 G/DL (ref 6.4–8.2)
Q-T INTERVAL, ECG07: 350 MS
QRS DURATION, ECG06: 84 MS
QTC CALCULATION (BEZET), ECG08: 416 MS
RBC # BLD AUTO: 4.95 M/UL (ref 4.1–5.7)
SODIUM SERPL-SCNC: 132 MMOL/L (ref 136–145)
TROPONIN-HIGH SENSITIVITY: 174 NG/L (ref 0–76)
VENTRICULAR RATE, ECG03: 85 BPM
WBC # BLD AUTO: 16.3 K/UL (ref 4.1–11.1)

## 2022-06-19 PROCEDURE — 94640 AIRWAY INHALATION TREATMENT: CPT

## 2022-06-19 PROCEDURE — 96365 THER/PROPH/DIAG IV INF INIT: CPT

## 2022-06-19 PROCEDURE — 36415 COLL VENOUS BLD VENIPUNCTURE: CPT

## 2022-06-19 PROCEDURE — 84145 PROCALCITONIN (PCT): CPT

## 2022-06-19 PROCEDURE — 94660 CPAP INITIATION&MGMT: CPT

## 2022-06-19 PROCEDURE — 84484 ASSAY OF TROPONIN QUANT: CPT

## 2022-06-19 PROCEDURE — 74011250636 HC RX REV CODE- 250/636: Performed by: INTERNAL MEDICINE

## 2022-06-19 PROCEDURE — 74011000250 HC RX REV CODE- 250: Performed by: EMERGENCY MEDICINE

## 2022-06-19 PROCEDURE — 71045 X-RAY EXAM CHEST 1 VIEW: CPT

## 2022-06-19 PROCEDURE — 74011250636 HC RX REV CODE- 250/636: Performed by: EMERGENCY MEDICINE

## 2022-06-19 PROCEDURE — 93005 ELECTROCARDIOGRAM TRACING: CPT

## 2022-06-19 PROCEDURE — 96375 TX/PRO/DX INJ NEW DRUG ADDON: CPT

## 2022-06-19 PROCEDURE — 65610000006 HC RM INTENSIVE CARE

## 2022-06-19 PROCEDURE — 74011000250 HC RX REV CODE- 250: Performed by: INTERNAL MEDICINE

## 2022-06-19 PROCEDURE — 5A09357 ASSISTANCE WITH RESPIRATORY VENTILATION, LESS THAN 24 CONSECUTIVE HOURS, CONTINUOUS POSITIVE AIRWAY PRESSURE: ICD-10-PCS | Performed by: EMERGENCY MEDICINE

## 2022-06-19 PROCEDURE — 85025 COMPLETE CBC W/AUTO DIFF WBC: CPT

## 2022-06-19 PROCEDURE — 80053 COMPREHEN METABOLIC PANEL: CPT

## 2022-06-19 PROCEDURE — 99285 EMERGENCY DEPT VISIT HI MDM: CPT

## 2022-06-19 PROCEDURE — 74011000250 HC RX REV CODE- 250

## 2022-06-19 PROCEDURE — 74011000258 HC RX REV CODE- 258: Performed by: EMERGENCY MEDICINE

## 2022-06-19 PROCEDURE — 96366 THER/PROPH/DIAG IV INF ADDON: CPT

## 2022-06-19 RX ORDER — BUDESONIDE AND FORMOTEROL FUMARATE DIHYDRATE 80; 4.5 UG/1; UG/1
2 AEROSOL RESPIRATORY (INHALATION) DAILY
Status: DISCONTINUED | OUTPATIENT
Start: 2022-06-20 | End: 2022-06-23 | Stop reason: HOSPADM

## 2022-06-19 RX ORDER — CLOPIDOGREL BISULFATE 75 MG/1
75 TABLET ORAL DAILY
Status: DISCONTINUED | OUTPATIENT
Start: 2022-06-20 | End: 2022-06-23 | Stop reason: HOSPADM

## 2022-06-19 RX ORDER — ASPIRIN 81 MG/1
81 TABLET ORAL DAILY
Status: DISCONTINUED | OUTPATIENT
Start: 2022-06-20 | End: 2022-06-23 | Stop reason: HOSPADM

## 2022-06-19 RX ORDER — ASPIRIN 81 MG/1
81 TABLET ORAL DAILY
COMMUNITY
Start: 2022-01-20

## 2022-06-19 RX ORDER — ACETAMINOPHEN 650 MG/1
650 SUPPOSITORY RECTAL
Status: DISCONTINUED | OUTPATIENT
Start: 2022-06-19 | End: 2022-06-23 | Stop reason: HOSPADM

## 2022-06-19 RX ORDER — ONDANSETRON 2 MG/ML
4 INJECTION INTRAMUSCULAR; INTRAVENOUS
Status: DISCONTINUED | OUTPATIENT
Start: 2022-06-19 | End: 2022-06-23 | Stop reason: HOSPADM

## 2022-06-19 RX ORDER — SODIUM CHLORIDE 0.9 % (FLUSH) 0.9 %
5-40 SYRINGE (ML) INJECTION EVERY 8 HOURS
Status: DISCONTINUED | OUTPATIENT
Start: 2022-06-19 | End: 2022-06-23 | Stop reason: HOSPADM

## 2022-06-19 RX ORDER — SODIUM CHLORIDE 0.9 % (FLUSH) 0.9 %
5-40 SYRINGE (ML) INJECTION AS NEEDED
Status: DISCONTINUED | OUTPATIENT
Start: 2022-06-19 | End: 2022-06-23 | Stop reason: HOSPADM

## 2022-06-19 RX ORDER — ACETAMINOPHEN 325 MG/1
650 TABLET ORAL
Status: DISCONTINUED | OUTPATIENT
Start: 2022-06-19 | End: 2022-06-23 | Stop reason: HOSPADM

## 2022-06-19 RX ORDER — POLYETHYLENE GLYCOL 3350 17 G/17G
17 POWDER, FOR SOLUTION ORAL DAILY PRN
Status: DISCONTINUED | OUTPATIENT
Start: 2022-06-19 | End: 2022-06-23 | Stop reason: HOSPADM

## 2022-06-19 RX ORDER — PREDNISONE 20 MG/1
20 TABLET ORAL 2 TIMES DAILY WITH MEALS
Status: DISCONTINUED | OUTPATIENT
Start: 2022-06-20 | End: 2022-06-23 | Stop reason: HOSPADM

## 2022-06-19 RX ORDER — METOPROLOL SUCCINATE 50 MG/1
50 TABLET, EXTENDED RELEASE ORAL DAILY
Status: DISCONTINUED | OUTPATIENT
Start: 2022-06-20 | End: 2022-06-23 | Stop reason: HOSPADM

## 2022-06-19 RX ORDER — ALLOPURINOL 100 MG/1
100 TABLET ORAL DAILY
Status: DISCONTINUED | OUTPATIENT
Start: 2022-06-20 | End: 2022-06-23 | Stop reason: HOSPADM

## 2022-06-19 RX ORDER — FLUTICASONE PROPIONATE AND SALMETEROL 100; 50 UG/1; UG/1
1 POWDER RESPIRATORY (INHALATION) EVERY 12 HOURS
COMMUNITY
Start: 2022-04-28

## 2022-06-19 RX ORDER — LORAZEPAM 2 MG/ML
1 INJECTION INTRAMUSCULAR ONCE
Status: COMPLETED | OUTPATIENT
Start: 2022-06-19 | End: 2022-06-19

## 2022-06-19 RX ORDER — IPRATROPIUM BROMIDE AND ALBUTEROL SULFATE 2.5; .5 MG/3ML; MG/3ML
3 SOLUTION RESPIRATORY (INHALATION)
Status: COMPLETED | OUTPATIENT
Start: 2022-06-19 | End: 2022-06-19

## 2022-06-19 RX ORDER — CLOPIDOGREL BISULFATE 75 MG/1
75 TABLET ORAL DAILY
COMMUNITY
Start: 2022-01-20

## 2022-06-19 RX ORDER — ATORVASTATIN CALCIUM 20 MG/1
20 TABLET, FILM COATED ORAL DAILY
Status: DISCONTINUED | OUTPATIENT
Start: 2022-06-20 | End: 2022-06-23 | Stop reason: HOSPADM

## 2022-06-19 RX ORDER — ATORVASTATIN CALCIUM 40 MG/1
20 TABLET, FILM COATED ORAL
Status: ON HOLD | COMMUNITY
Start: 2022-04-26 | End: 2022-06-20

## 2022-06-19 RX ORDER — IPRATROPIUM BROMIDE AND ALBUTEROL SULFATE 2.5; .5 MG/3ML; MG/3ML
3 SOLUTION RESPIRATORY (INHALATION)
Status: DISCONTINUED | OUTPATIENT
Start: 2022-06-19 | End: 2022-06-20

## 2022-06-19 RX ORDER — LANOLIN ALCOHOL/MO/W.PET/CERES
1000 CREAM (GRAM) TOPICAL DAILY
Status: DISCONTINUED | OUTPATIENT
Start: 2022-06-20 | End: 2022-06-20

## 2022-06-19 RX ORDER — SPIRONOLACTONE 25 MG/1
25 TABLET ORAL DAILY
Status: DISCONTINUED | OUTPATIENT
Start: 2022-06-20 | End: 2022-06-23 | Stop reason: HOSPADM

## 2022-06-19 RX ORDER — IPRATROPIUM BROMIDE AND ALBUTEROL SULFATE 2.5; .5 MG/3ML; MG/3ML
SOLUTION RESPIRATORY (INHALATION)
Status: COMPLETED
Start: 2022-06-19 | End: 2022-06-19

## 2022-06-19 RX ORDER — ONDANSETRON 4 MG/1
4 TABLET, ORALLY DISINTEGRATING ORAL
Status: DISCONTINUED | OUTPATIENT
Start: 2022-06-19 | End: 2022-06-23 | Stop reason: HOSPADM

## 2022-06-19 RX ORDER — ENOXAPARIN SODIUM 100 MG/ML
40 INJECTION SUBCUTANEOUS DAILY
Status: DISCONTINUED | OUTPATIENT
Start: 2022-06-20 | End: 2022-06-23 | Stop reason: HOSPADM

## 2022-06-19 RX ORDER — LOSARTAN POTASSIUM 50 MG/1
50 TABLET ORAL DAILY
Status: DISCONTINUED | OUTPATIENT
Start: 2022-06-20 | End: 2022-06-23 | Stop reason: HOSPADM

## 2022-06-19 RX ORDER — ISOSORBIDE MONONITRATE 60 MG/1
60 TABLET, EXTENDED RELEASE ORAL DAILY
Status: DISCONTINUED | OUTPATIENT
Start: 2022-06-20 | End: 2022-06-23 | Stop reason: HOSPADM

## 2022-06-19 RX ADMIN — IPRATROPIUM BROMIDE AND ALBUTEROL SULFATE 3 ML: .5; 2.5 SOLUTION RESPIRATORY (INHALATION) at 17:42

## 2022-06-19 RX ADMIN — DOXYCYCLINE 100 MG: 100 INJECTION, POWDER, LYOPHILIZED, FOR SOLUTION INTRAVENOUS at 18:16

## 2022-06-19 RX ADMIN — IPRATROPIUM BROMIDE AND ALBUTEROL SULFATE: 2.5; .5 SOLUTION RESPIRATORY (INHALATION) at 19:14

## 2022-06-19 RX ADMIN — SODIUM CHLORIDE, PRESERVATIVE FREE 10 ML: 5 INJECTION INTRAVENOUS at 21:51

## 2022-06-19 RX ADMIN — IPRATROPIUM BROMIDE AND ALBUTEROL SULFATE: .5; 3 SOLUTION RESPIRATORY (INHALATION) at 19:14

## 2022-06-19 RX ADMIN — LORAZEPAM 1 MG: 2 INJECTION INTRAMUSCULAR at 23:16

## 2022-06-19 RX ADMIN — METHYLPREDNISOLONE SODIUM SUCCINATE 125 MG: 125 INJECTION, POWDER, FOR SOLUTION INTRAMUSCULAR; INTRAVENOUS at 17:41

## 2022-06-20 LAB
ANION GAP SERPL CALC-SCNC: 8 MMOL/L (ref 5–15)
ARTERIAL PATENCY WRIST A: POSITIVE
BASE DEFICIT BLDA-SCNC: 1.9 MMOL/L (ref 0–2)
BASOPHILS # BLD: 0 K/UL (ref 0–0.1)
BASOPHILS NFR BLD: 0 % (ref 0–1)
BDY SITE: NORMAL
BUN SERPL-MCNC: 24 MG/DL (ref 6–20)
BUN/CREAT SERPL: 24 (ref 12–20)
CA-I BLD-MCNC: 8.7 MG/DL (ref 8.5–10.1)
CHLORIDE SERPL-SCNC: 102 MMOL/L (ref 97–108)
CO2 SERPL-SCNC: 25 MMOL/L (ref 21–32)
CREAT SERPL-MCNC: 0.99 MG/DL (ref 0.7–1.3)
DIFFERENTIAL METHOD BLD: ABNORMAL
EOSINOPHIL # BLD: 0 K/UL (ref 0–0.4)
EOSINOPHIL NFR BLD: 0 % (ref 0–7)
ERYTHROCYTE [DISTWIDTH] IN BLOOD BY AUTOMATED COUNT: 12.6 % (ref 11.5–14.5)
FIO2 ON VENT: 40 %
GAS FLOW.O2 SETTING OXYMISER: 8 L/MIN
GLUCOSE SERPL-MCNC: 181 MG/DL (ref 65–100)
HCO3 BLDA-SCNC: 24 MMOL/L (ref 22–26)
HCT VFR BLD AUTO: 40.7 % (ref 36.6–50.3)
HGB BLD-MCNC: 14.1 G/DL (ref 12.1–17)
IMM GRANULOCYTES # BLD AUTO: 0 K/UL (ref 0–0.04)
IMM GRANULOCYTES NFR BLD AUTO: 0 % (ref 0–0.5)
LYMPHOCYTES # BLD: 0.5 K/UL (ref 0.8–3.5)
LYMPHOCYTES NFR BLD: 5 % (ref 12–49)
MCH RBC QN AUTO: 29.9 PG (ref 26–34)
MCHC RBC AUTO-ENTMCNC: 34.6 G/DL (ref 30–36.5)
MCV RBC AUTO: 86.2 FL (ref 80–99)
MONOCYTES # BLD: 0.1 K/UL (ref 0–1)
MONOCYTES NFR BLD: 1 % (ref 5–13)
MRSA DNA SPEC QL NAA+PROBE: NOT DETECTED
NEUTS SEG # BLD: 8.9 K/UL (ref 1.8–8)
NEUTS SEG NFR BLD: 94 % (ref 32–75)
NRBC # BLD: 0 K/UL (ref 0–0.01)
NRBC BLD-RTO: 0 PER 100 WBC
PCO2 BLDA: 43 MMHG (ref 35–45)
PEEP MAX SETTING VENT: 20 CM[H2O]
PEEP RESPIRATORY: 5 CM[H2O]
PERFORMED BY, TECHID: NORMAL
PH BLDA: 7.36 [PH] (ref 7.35–7.45)
PLATELET # BLD AUTO: 166 K/UL (ref 150–400)
PMV BLD AUTO: 9.8 FL (ref 8.9–12.9)
PO2 BLDA: 93 MMHG (ref 75–100)
POTASSIUM SERPL-SCNC: 4.7 MMOL/L (ref 3.5–5.1)
RBC # BLD AUTO: 4.72 M/UL (ref 4.1–5.7)
SAO2 % BLD: 97 %
SAO2% DEVICE SAO2% SENSOR NAME: NORMAL
SODIUM SERPL-SCNC: 135 MMOL/L (ref 136–145)
SPECIMEN SITE: NORMAL
TROPONIN-HIGH SENSITIVITY: 126 NG/L (ref 0–76)
VT SETTING VENT: 475 ML
WBC # BLD AUTO: 9.5 K/UL (ref 4.1–11.1)

## 2022-06-20 PROCEDURE — 65610000006 HC RM INTENSIVE CARE

## 2022-06-20 PROCEDURE — 74011636637 HC RX REV CODE- 636/637: Performed by: INTERNAL MEDICINE

## 2022-06-20 PROCEDURE — 74011250636 HC RX REV CODE- 250/636: Performed by: INTERNAL MEDICINE

## 2022-06-20 PROCEDURE — 94660 CPAP INITIATION&MGMT: CPT

## 2022-06-20 PROCEDURE — 85025 COMPLETE CBC W/AUTO DIFF WBC: CPT

## 2022-06-20 PROCEDURE — 87641 MR-STAPH DNA AMP PROBE: CPT

## 2022-06-20 PROCEDURE — 74011250637 HC RX REV CODE- 250/637: Performed by: INTERNAL MEDICINE

## 2022-06-20 PROCEDURE — 36600 WITHDRAWAL OF ARTERIAL BLOOD: CPT

## 2022-06-20 PROCEDURE — 36415 COLL VENOUS BLD VENIPUNCTURE: CPT

## 2022-06-20 PROCEDURE — 94640 AIRWAY INHALATION TREATMENT: CPT

## 2022-06-20 PROCEDURE — 80048 BASIC METABOLIC PNL TOTAL CA: CPT

## 2022-06-20 PROCEDURE — 74011000250 HC RX REV CODE- 250: Performed by: INTERNAL MEDICINE

## 2022-06-20 PROCEDURE — 84484 ASSAY OF TROPONIN QUANT: CPT

## 2022-06-20 PROCEDURE — 82803 BLOOD GASES ANY COMBINATION: CPT

## 2022-06-20 RX ORDER — IPRATROPIUM BROMIDE AND ALBUTEROL SULFATE 2.5; .5 MG/3ML; MG/3ML
3 SOLUTION RESPIRATORY (INHALATION)
Status: DISCONTINUED | OUTPATIENT
Start: 2022-06-21 | End: 2022-06-21

## 2022-06-20 RX ORDER — LORAZEPAM 2 MG/ML
1 INJECTION INTRAMUSCULAR
Status: DISCONTINUED | OUTPATIENT
Start: 2022-06-20 | End: 2022-06-23 | Stop reason: HOSPADM

## 2022-06-20 RX ADMIN — PREDNISONE 20 MG: 20 TABLET ORAL at 09:40

## 2022-06-20 RX ADMIN — ISOSORBIDE MONONITRATE 60 MG: 60 TABLET, EXTENDED RELEASE ORAL at 12:22

## 2022-06-20 RX ADMIN — LOSARTAN POTASSIUM 50 MG: 50 TABLET, FILM COATED ORAL at 12:41

## 2022-06-20 RX ADMIN — ENOXAPARIN SODIUM 40 MG: 100 INJECTION SUBCUTANEOUS at 09:40

## 2022-06-20 RX ADMIN — ALLOPURINOL 100 MG: 100 TABLET ORAL at 09:40

## 2022-06-20 RX ADMIN — AZITHROMYCIN MONOHYDRATE 500 MG: 500 INJECTION, POWDER, LYOPHILIZED, FOR SOLUTION INTRAVENOUS at 22:26

## 2022-06-20 RX ADMIN — SPIRONOLACTONE 25 MG: 25 TABLET ORAL at 12:22

## 2022-06-20 RX ADMIN — ATORVASTATIN CALCIUM 20 MG: 20 TABLET, FILM COATED ORAL at 12:22

## 2022-06-20 RX ADMIN — SODIUM CHLORIDE, PRESERVATIVE FREE 10 ML: 5 INJECTION INTRAVENOUS at 06:59

## 2022-06-20 RX ADMIN — IPRATROPIUM BROMIDE AND ALBUTEROL SULFATE 3 ML: .5; 2.5 SOLUTION RESPIRATORY (INHALATION) at 01:24

## 2022-06-20 RX ADMIN — CLOPIDOGREL BISULFATE 75 MG: 75 TABLET ORAL at 12:23

## 2022-06-20 RX ADMIN — METOPROLOL SUCCINATE 50 MG: 50 TABLET, EXTENDED RELEASE ORAL at 12:23

## 2022-06-20 RX ADMIN — SODIUM CHLORIDE, PRESERVATIVE FREE 10 ML: 5 INJECTION INTRAVENOUS at 22:26

## 2022-06-20 RX ADMIN — ASPIRIN 81 MG: 81 TABLET, COATED ORAL at 09:40

## 2022-06-20 RX ADMIN — PREDNISONE 20 MG: 20 TABLET ORAL at 18:00

## 2022-06-20 RX ADMIN — SODIUM CHLORIDE, PRESERVATIVE FREE 10 ML: 5 INJECTION INTRAVENOUS at 15:42

## 2022-06-20 NOTE — CONSULTS
Pulmonary/ CC Consult    Subjective:   Date of Consultation:  June 20, 2022  Referring Physician: Reyes Pascal, MD    Mr. Juli Gooden a 80years old male who has known history of COPD from ongoing chronic smoking, history of coronary artery disease and hypertension. He works as a . He was working until few days ago when he started having symptoms of shortness of breath along with chest tightness and wheezing. This was associated with productive cough. He denies any high-grade fever or chills. Progressively his condition worsen and he had to come to the emergency department. Uses his inhalers at home but lately they have been ineffective. ER he was found to be in severe respiratory distress and had to be placed on BiPAP. Admitting chest x-ray did not show any significant lung consolidation. Blood gas was done in the ER on BiPAP at setting of 20/5 which showed 7.3 6/43/93/24 on 40% FiO2. At the time of my evaluation the ICU he is alert and responding appropriately. He is now switched to high flow nasal cannula oxygen with nasal pillow interface. Patient denied any chest pains or any palpitations.       Patient Active Problem List   Diagnosis Code    Chest pain R07.9    CAD (coronary artery disease) I25.10    [facility] J44.1     Past Medical History:   Diagnosis Date    CAD (coronary artery disease)     2 stents    Chest pain     States occasional CP x1 month    Chronic obstructive pulmonary disease (HCC)     History of blood transfusion     States from bleeding in stomach, from Plavix    Hypercholesteremia     Hypertension     Multiple lung nodules     Recent finding, follow up in 6 months    Pancreatitis     History of    PTSD (post-traumatic stress disorder)     Sleep apnea     no longer uses CPAP      Family History   Problem Relation Age of Onset    Diabetes Mother     Diabetes Father     Diabetes Sister     Diabetes Brother     Stroke Brother     Kidney Disease Brother       Social History     Tobacco Use    Smoking status: Current Every Day Smoker    Smokeless tobacco: Never Used    Tobacco comment: Trying to quit, 5-6 cigarettes a day   Substance Use Topics    Alcohol use: Yes     Comment: 2-3 beers daily     Past Surgical History:   Procedure Laterality Date    HX COLONOSCOPY      [place] CARDIAC SURG PROCEDURE UNLIST      Cardiac cath, 2 stents      Prior to Admission medications    Medication Sig Start Date End Date Taking? Authorizing Provider   aspirin delayed-release 81 mg tablet Take 81 mg by mouth daily. 22  Yes Provider, Historical   clopidogreL (PLAVIX) 75 mg tab Take 75 mg by mouth daily. 22  Yes Provider, Historical   fluticasone propion-salmeteroL (ADVAIR/WIXELA) 100-50 mcg/dose diskus inhaler Take 1 Puff by inhalation every twelve (12) hours. 22  Yes Provider, Historical   albuterol (ProAir HFA) 90 mcg/actuation inhaler Take 2 Puffs by inhalation as needed for Wheezing or Cough. Yes Provider, Historical   budesonide-formoteroL (Symbicort) 80-4.5 mcg/actuation HFAA Take 2 Puffs by inhalation daily. Yes Provider, Historical   tiotropium bromide (Spiriva Respimat) 2.5 mcg/actuation inhaler Take 2 Puffs by inhalation daily. Yes Provider, Historical   metoprolol succinate (TOPROL-XL) 50 mg XL tablet Take 50 mg by mouth daily. Yes Provider, Historical   losartan (COZAAR) 50 mg tablet Take 50 mg by mouth daily. Yes Provider, Historical   isosorbide mononitrate ER (IMDUR) 120 mg CR tablet Take 120 mg by mouth daily. 1/2 tablet   Yes Provider, Historical     No Known Allergies     Review of Systems:    Systems were reviewed. Positive pertinent findings are mentioned above  Rest of the examination review essentially unremarkable. Objective:   Blood pressure (!) 141/90, pulse 84, temperature 97.3 °F (36.3 °C), resp. rate 29, height 5' 4\" (1.626 m), weight 67.1 kg (148 lb), SpO2 96 %.   Temp (24hrs), Av.9 °F (36.6 °C), Min:97.3 °F (36.3 °C), Max:99 °F (37.2 °C)    XR CHEST PORT   Final Result      No acute abnormality. Data Review:   Current Facility-Administered Medications   Medication Dose Route Frequency    LORazepam (ATIVAN) injection 1 mg  1 mg IntraVENous Q8H PRN    predniSONE (DELTASONE) tablet 20 mg  20 mg Oral BID WITH MEALS    albuterol-ipratropium (DUO-NEB) 2.5 MG-0.5 MG/3 ML  3 mL Nebulization Q4H PRN    allopurinoL (ZYLOPRIM) tablet 100 mg  100 mg Oral DAILY    aspirin delayed-release tablet 81 mg  81 mg Oral DAILY    atorvastatin (LIPITOR) tablet 20 mg  20 mg Oral DAILY    budesonide-formoterol (SYMBICORT) 80-4.5 mcg inhaler  2 Puff Inhalation DAILY    clopidogreL (PLAVIX) tablet 75 mg  75 mg Oral DAILY    cyanocobalamin tablet 1,000 mcg  1,000 mcg Oral DAILY    isosorbide mononitrate ER (IMDUR) tablet 60 mg  60 mg Oral DAILY    losartan (COZAAR) tablet 50 mg  50 mg Oral DAILY    metoprolol succinate (TOPROL-XL) XL tablet 50 mg  50 mg Oral DAILY    spironolactone (ALDACTONE) tablet 25 mg  25 mg Oral DAILY    tiotropium bromide (SPIRIVA RESPIMAT) 2.5 mcg /actuation  2 Puff Inhalation DAILY    sodium chloride (NS) flush 5-40 mL  5-40 mL IntraVENous Q8H    sodium chloride (NS) flush 5-40 mL  5-40 mL IntraVENous PRN    acetaminophen (TYLENOL) tablet 650 mg  650 mg Oral Q6H PRN    Or    acetaminophen (TYLENOL) suppository 650 mg  650 mg Rectal Q6H PRN    polyethylene glycol (MIRALAX) packet 17 g  17 g Oral DAILY PRN    ondansetron (ZOFRAN ODT) tablet 4 mg  4 mg Oral Q8H PRN    Or    ondansetron (ZOFRAN) injection 4 mg  4 mg IntraVENous Q6H PRN    enoxaparin (LOVENOX) injection 40 mg  40 mg SubCUTAneous DAILY    azithromycin (ZITHROMAX) 500 mg in 0.9% sodium chloride 250 mL ([id number])  500 mg IntraVENous Q24H        Exam:     This is an elderly male who is currently in respiratory distress. He is looking short of breath. He is on high flow nasal cannula oxygen with nasal pillow interface.   Is alert and oriented x3. Head normocephalic and atraumatic, pupils are unresponsive to light clinic recommended therapy. Neck is supple. No cervical lymphadenopathy. Thyroid not enlarged  Chest: Patient has prolonged expiratory phase of breathing with rhonchi and expiratory wheezing audible. Heart: S1-S2 normal  Abdomen: Soft, nontender, no visceromegaly  Extremities: No edema, cyanosis or clubbing  Neuro: No focal motor deficit. Recent Results (from the past 24 hour(s))   EKG, 12 LEAD, INITIAL    Collection Time: 06/19/22  5:32 PM   Result Value Ref Range    Ventricular Rate 85 BPM    Atrial Rate 85 BPM    P-R Interval 158 ms    QRS Duration 84 ms    Q-T Interval 350 ms    QTC Calculation (Bezet) 416 ms    Calculated P Axis 80 degrees    Calculated R Axis 80 degrees    Calculated T Axis 106 degrees    Diagnosis       Normal sinus rhythm with sinus arrhythmia  Normal ECG  When compared with ECG of 04-JAN-2022 18:04,  No significant change was found    Confirmed by Morgan Pringle ([id number]) on 6/19/2022 6:02:33 PM     CBC WITH AUTOMATED DIFF    Collection Time: 06/19/22  5:40 PM   Result Value Ref Range    WBC 16.3 (H) 4.1 - 11.1 K/uL    RBC 4.95 4.10 - 5.70 M/uL    HGB 14.9 12.1 - 17.0 g/dL    HCT 42.8 36.6 - 50.3 %    MCV 86.5 80.0 - 99.0 FL    MCH 30.1 26.0 - 34.0 PG    MCHC 34.8 30.0 - 36.5 g/dL    RDW 12.5 11.5 - 14.5 %    PLATELET 106 302 - 730 K/uL    MPV 9.7 8.9 - 12.9 FL    NRBC 0.0 0.0  WBC    ABSOLUTE NRBC 0.00 0.00 - 0.01 K/uL    NEUTROPHILS 85 (H) 32 - 75 %    LYMPHOCYTES 6 (L) 12 - 49 %    MONOCYTES 6 5 - 13 %    EOSINOPHILS 2 0 - 7 %    BASOPHILS 0 0 - 1 %    IMMATURE GRANULOCYTES 1 (H) 0 - 0.5 %    ABS. NEUTROPHILS 13.8 (H) 1.8 - 8.0 K/UL    ABS. LYMPHOCYTES 1.0 0.8 - 3.5 K/UL    ABS. MONOCYTES 1.0 0.0 - 1.0 K/UL    ABS. EOSINOPHILS 0.3 0.0 - 0.4 K/UL    ABS. BASOPHILS 0.1 0.0 - 0.1 K/UL    ABS. IMM.  GRANS. 0.1 (H) 0.00 - 0.04 K/UL    DF AUTOMATED     METABOLIC PANEL, COMPREHENSIVE    Collection Time: 06/19/22  5:40 PM   Result Value Ref Range    Sodium 132 (L) 136 - 145 mmol/L    Potassium 4.5 3.5 - 5.1 mmol/L    Chloride 100 97 - 108 mmol/L    CO2 25 21 - 32 mmol/L    Anion gap 7 5 - 15 mmol/L    Glucose 125 (H) 65 - 100 mg/dL    BUN 22 (H) 6 - 20 mg/dL    Creatinine 1.23 0.70 - 1.30 mg/dL    BUN/Creatinine ratio 18 12 - 20      GFR est AA >60 >60 ml/min/1.73m2    GFR est non-AA 58 (L) >60 ml/min/1.73m2    Calcium 9.2 8.5 - 10.1 mg/dL    Bilirubin, total 1.1 (H) 0.2 - 1.0 mg/dL    AST (SGOT) 24 15 - 37 U/L    ALT (SGPT) 25 12 - 78 U/L    Alk. phosphatase 49 45 - 117 U/L    Protein, total 7.9 6.4 - 8.2 g/dL    Albumin 4.4 3.5 - 5.0 g/dL    Globulin 3.5 2.0 - 4.0 g/dL    A-G Ratio 1.3 1.1 - 2.2     TROPONIN-HIGH SENSITIVITY    Collection Time: 06/19/22  5:40 PM   Result Value Ref Range    Troponin-High Sensitivity 174 (HH) 0 - 76 ng/L   PROCALCITONIN    Collection Time: 06/19/22  8:45 PM   Result Value Ref Range    Procalcitonin <0.05 (H) 0 ng/mL   MRSA SCREEN - PCR (NASAL)    Collection Time: 06/20/22  4:44 AM   Result Value Ref Range    MRSA by PCR, Nasal Not Detected Not Detected     CBC WITH AUTOMATED DIFF    Collection Time: 06/20/22  4:59 AM   Result Value Ref Range    WBC 9.5 4.1 - 11.1 K/uL    RBC 4.72 4.10 - 5.70 M/uL    HGB 14.1 12.1 - 17.0 g/dL    HCT 40.7 36.6 - 50.3 %    MCV 86.2 80.0 - 99.0 FL    MCH 29.9 26.0 - 34.0 PG    MCHC 34.6 30.0 - 36.5 g/dL    RDW 12.6 11.5 - 14.5 %    PLATELET 980 726 - 357 K/uL    MPV 9.8 8.9 - 12.9 FL    NRBC 0.0 0.0  WBC    ABSOLUTE NRBC 0.00 0.00 - 0.01 K/uL    NEUTROPHILS 94 (H) 32 - 75 %    LYMPHOCYTES 5 (L) 12 - 49 %    MONOCYTES 1 (L) 5 - 13 %    EOSINOPHILS 0 0 - 7 %    BASOPHILS 0 0 - 1 %    IMMATURE GRANULOCYTES 0 0 - 0.5 %    ABS. NEUTROPHILS 8.9 (H) 1.8 - 8.0 K/UL    ABS. LYMPHOCYTES 0.5 (L) 0.8 - 3.5 K/UL    ABS. MONOCYTES 0.1 0.0 - 1.0 K/UL    ABS. EOSINOPHILS 0.0 0.0 - 0.4 K/UL    ABS. BASOPHILS 0.0 0.0 - 0.1 K/UL    ABS. IMM.  GRANS. 0.0 0.00 - 0.04 K/UL    DF AUTOMATED     METABOLIC PANEL, BASIC    Collection Time: 06/20/22  4:59 AM   Result Value Ref Range    Sodium 135 (L) 136 - 145 mmol/L    Potassium 4.7 3.5 - 5.1 mmol/L    Chloride 102 97 - 108 mmol/L    CO2 25 21 - 32 mmol/L    Anion gap 8 5 - 15 mmol/L    Glucose 181 (H) 65 - 100 mg/dL    BUN 24 (H) 6 - 20 mg/dL    Creatinine 0.99 0.70 - 1.30 mg/dL    BUN/Creatinine ratio 24 (H) 12 - 20      GFR est AA >60 >60 ml/min/1.73m2    GFR est non-AA >60 >60 ml/min/1.73m2    Calcium 8.7 8.5 - 10.1 mg/dL   TROPONIN-HIGH SENSITIVITY    Collection Time: 06/20/22  4:59 AM   Result Value Ref Range    Troponin-High Sensitivity 126 (HH) 0 - 76 ng/L   BLOOD GAS, ARTERIAL    Collection Time: 06/20/22  5:54 AM   Result Value Ref Range    pH 7.36 7.35 - 7.45      PCO2 43 35 - 45 mmHg    PO2 93 75 - 100 mmHg    O2 SATURATION 97 >95 %    BICARBONATE 24 22 - 26 mmol/L    BASE DEFICIT 1.9 0 - 2 mmol/L    O2 METHOD BiPAP      FIO2 40 %    Tidal volume 475      SET RATE 8      PEEP/CPAP 5      High PEEP 20      Sample source Arterial      SITE Right Radial      ANDREW'S TEST Positive      Performed by [id number]         Impression: This is an elderly male who has known history of advanced COPD from ongoing smoking, history of hypertension, coronary artery disease who is admitted hospital because of acute hypoxic respiratory failure which did not improve with outpatient nebulizers and Advair therapy. He was in severe respiratory distress on admission in the ER and was placed on BiPAP. Plan:   1. Acute hypoxic and hypercapnic respiratory failure:  Is resulted because of acute exacerbation of COPD. Patient's blood gas on BiPAP at 20/5 40% FiO2 showed  7.3 6/43/93/24  he is now switched to high flow nasal cannula oxygen. Will repeat blood gas in the morning. 2.  Acute exacerbation of COPD:  Patient has prolonged operatory force of breathing with coarse wheezing. His chest is tight.   His chest x-ray did not show any lung consolidation. Will start patient on nebulized albuterol and Atrovent every 2 hours which will be spaced to every 3 hours later today. He got started on systemic steroids with prednisone 20 mg twice daily  IV antibiotics will be continued. 3.  Coronary artery disease:  He does have history of coronary artery disease and he follows with his cardiologist Dr. Cindy Ndiaye. 4.  Attention:  He is on his antihypertensive medication  5. Smoking:  He is advised to quit smoking. Counseling is done. Nicotine patch as prescribed. He will be on DVT prophylaxis during his hospital stay  More than 55 minutes were spent in patient's evaluation, management and decision making. .  Thank you for involving me in the management of the patient    Alyce Saenz MD  [facility]

## 2022-06-20 NOTE — PROGRESS NOTES
Reason for Admission:  COPD                     RUR Score:   9%                  Plan for utilizing home health:  None @ this time/uses no DME. PCP: First and Last name:  Larry Bashir      Name of Practice:    Are you a current patient: Yes/No: Yes   Approximate date of last visit: Seen 6 mos ago. Can you participate in a virtual visit with your PCP: Yes/Call/Has cell phone. Current Advanced Directive/Advance Care Plan: Full Code      Healthcare Decision Maker:   PRHCDM is wife Rodri Elmore ) @ [phone]. Transition of Care Plan:   D/C Plan is home with wife/family & wife to transport home. Call Rxs into [facility] in [place] upon discharge.

## 2022-06-20 NOTE — H&P
History and Physical    Patient: David Verma MRN: [id number]  SSN: [id number]    YOB: 1950  Age: 80 y.o. Sex: male      Subjective:      David Verma is a 80 y.o. male with PMH of COPD, ? Lung nodules, CAD, hypertension. He presented to the ED with chief complaint of shortness of breath for the past 1 week. Associated with cough, however denies fever or chills. Further history difficult to obtain secondary to severe respiratory distress. In ED, patient was put on BiPAP. Attempt to wean to nasal cannula after 3 hours, however unable to tolerate. Noted leukocytosis and SUSAN. Past Medical History:   Diagnosis Date    CAD (coronary artery disease)     2 stents    Chest pain     States occasional CP x1 month    Chronic obstructive pulmonary disease (HCC)     History of blood transfusion     States from bleeding in stomach, from Plavix    Hypercholesteremia     Hypertension     Multiple lung nodules     Recent finding, follow up in 6 months    Pancreatitis     History of    PTSD (post-traumatic stress disorder)     Sleep apnea     no longer uses CPAP     Past Surgical History:   Procedure Laterality Date    HX COLONOSCOPY      [place] CARDIAC SURG PROCEDURE UNLIST      Cardiac cath, 2 stents      Family History   Problem Relation Age of Onset    Diabetes Mother     Diabetes Father     Diabetes Sister     Diabetes Brother     Stroke Brother     Kidney Disease Brother      Social History     Tobacco Use    Smoking status: Current Every Day Smoker    Smokeless tobacco: Never Used    Tobacco comment: Trying to quit, 5-6 cigarettes a day   Substance Use Topics    Alcohol use: Yes     Comment: 2-3 beers daily      Prior to Admission medications    Medication Sig Start Date End Date Taking?  Authorizing Provider   atorvastatin (LIPITOR) 40 mg tablet Take 20 mg by mouth. 4/26/22  Yes Provider, Historical   fluticasone propion-salmeteroL (ADVAIR/WIXELA) 100-50 mcg/dose diskus inhaler Take 1 Puff by inhalation every twelve (12) hours. 4/28/22  Yes Provider, Historical   albuterol (ProAir HFA) 90 mcg/actuation inhaler Take 2 Puffs by inhalation as needed for Wheezing or Cough. Yes Provider, Historical   budesonide-formoteroL (Symbicort) 80-4.5 mcg/actuation HFAA Take 2 Puffs by inhalation daily. Yes Provider, Historical   tiotropium bromide (Spiriva Respimat) 2.5 mcg/actuation inhaler Take 2 Puffs by inhalation daily. Yes Provider, Historical   cyanocobalamin (VITAMIN B12) 500 mcg tablet Take 1,000 mcg by mouth daily. Yes Provider, Historical   metoprolol succinate (TOPROL-XL) 50 mg XL tablet Take 50 mg by mouth daily. Yes Provider, Historical   multivitamin (ONE A DAY) tablet Take 1 Tablet by mouth daily. Yes Provider, Historical   losartan (COZAAR) 50 mg tablet Take 50 mg by mouth daily. Yes Provider, Historical   cholecalciferol (VITAMIN D3) 25 mcg (1,000 unit) cap Take 1,000 Units by mouth daily. 2 tablets   Yes Provider, Historical   omega-3 fatty acids/fish oil (FISH OIL OMEGA 3-6-9 PO) Take  by mouth daily. Yes Provider, Historical   isosorbide mononitrate ER (IMDUR) 120 mg CR tablet Take 120 mg by mouth daily. 1/2 tablet   Yes Provider, Historical   sertraline HCl (SERTRALINE PO) Take  by mouth daily. Yes Provider, Historical   aspirin delayed-release 81 mg tablet Take 81 mg by mouth daily. Patient not taking: Reported on 6/19/2022 1/20/22   Provider, Historical   clopidogreL (PLAVIX) 75 mg tab Take 75 mg by mouth daily. Patient not taking: Reported on 6/19/2022 1/20/22   Provider, Historical   allopurinoL (ZYLOPRIM) 100 mg tablet Take 100 mg by mouth daily. Provider, Historical   rosuvastatin (CRESTOR) 40 mg tablet Take 20 mg by mouth daily. Patient not taking: Reported on 6/19/2022    Provider, Historical   spironolactone (ALDACTONE) 25 mg tablet Take 25 mg by mouth daily.     Provider, Historical   magnesium oxide 420 mg tab Take 420 mg by mouth daily as needed (leg cramps). Patient not taking: Reported on 6/19/2022    Provider, Historical        No Known Allergies    Review of Systems:   Constitutional: No fevers, No chills, No fatigue, No weakness  Eyes: No visual disturbance  Ears, Nose, Mouth, Throat, and Face: No nasal congestion, No sore throat  Respiratory: See HPI  Cardiovascular: No chest pain, No lower extremity edema, No Palpitations   Gastrointestinal: No nausea, No vomiting, No diarrhea, No constipation, No abdominal pain  Genitourinary: No frequency, No dysuria, No hematuria  Integument/Breast: No rash, No skin lesion(s), No dryness  Musculoskeletal: No arthralgias, No neck pain, No back pain  Neurological: No headaches, No dizziness, No confusion,  No seizures  Behavioral/Psychiatric: No anxiety, No depression      Objective:     Vitals:    06/19/22 1919 06/19/22 2126 06/19/22 2210 06/19/22 2300   BP:  126/78     Pulse:  75     Resp:  28     Temp:  99 °F (37.2 °C)  97.3 °F (36.3 °C)   SpO2: 98% 97% 98%    Weight:       Height:            Physical Exam:   General: alert, cooperative, no distress  Eye: conjunctivae/corneas clear. PERRL, EOM's intact. Throat and Neck: normal and no erythema or exudates noted. No mass   Lung: Generalized wheezing on auscultation bilaterally  Heart: regular rate and rhythm,   Abdomen: soft, non-tender. Bowel sounds normal. No masses,  Extremities:  able to move all extremities normal, atraumatic  Skin: Normal.  Neurologic: AOx3. Motor function and sensation grossly intact.   Psychiatric: non focal    Recent Results (from the past 24 hour(s))   EKG, 12 LEAD, INITIAL    Collection Time: 06/19/22  5:32 PM   Result Value Ref Range    Ventricular Rate 85 BPM    Atrial Rate 85 BPM    P-R Interval 158 ms    QRS Duration 84 ms    Q-T Interval 350 ms    QTC Calculation (Bezet) 416 ms    Calculated P Axis 80 degrees    Calculated R Axis 80 degrees    Calculated T Axis 106 degrees    Diagnosis       Normal sinus rhythm with sinus arrhythmia  Normal ECG  When compared with ECG of 04-JAN-2022 18:04,  No significant change was found    Confirmed by Per Keller ([id number]) on 6/19/2022 6:02:33 PM     CBC WITH AUTOMATED DIFF    Collection Time: 06/19/22  5:40 PM   Result Value Ref Range    WBC 16.3 (H) 4.1 - 11.1 K/uL    RBC 4.95 4.10 - 5.70 M/uL    HGB 14.9 12.1 - 17.0 g/dL    HCT 42.8 36.6 - 50.3 %    MCV 86.5 80.0 - 99.0 FL    MCH 30.1 26.0 - 34.0 PG    MCHC 34.8 30.0 - 36.5 g/dL    RDW 12.5 11.5 - 14.5 %    PLATELET 590 986 - 668 K/uL    MPV 9.7 8.9 - 12.9 FL    NRBC 0.0 0.0  WBC    ABSOLUTE NRBC 0.00 0.00 - 0.01 K/uL    NEUTROPHILS 85 (H) 32 - 75 %    LYMPHOCYTES 6 (L) 12 - 49 %    MONOCYTES 6 5 - 13 %    EOSINOPHILS 2 0 - 7 %    BASOPHILS 0 0 - 1 %    IMMATURE GRANULOCYTES 1 (H) 0 - 0.5 %    ABS. NEUTROPHILS 13.8 (H) 1.8 - 8.0 K/UL    ABS. LYMPHOCYTES 1.0 0.8 - 3.5 K/UL    ABS. MONOCYTES 1.0 0.0 - 1.0 K/UL    ABS. EOSINOPHILS 0.3 0.0 - 0.4 K/UL    ABS. BASOPHILS 0.1 0.0 - 0.1 K/UL    ABS. IMM. GRANS. 0.1 (H) 0.00 - 0.04 K/UL    DF AUTOMATED     METABOLIC PANEL, COMPREHENSIVE    Collection Time: 06/19/22  5:40 PM   Result Value Ref Range    Sodium 132 (L) 136 - 145 mmol/L    Potassium 4.5 3.5 - 5.1 mmol/L    Chloride 100 97 - 108 mmol/L    CO2 25 21 - 32 mmol/L    Anion gap 7 5 - 15 mmol/L    Glucose 125 (H) 65 - 100 mg/dL    BUN 22 (H) 6 - 20 mg/dL    Creatinine 1.23 0.70 - 1.30 mg/dL    BUN/Creatinine ratio 18 12 - 20      GFR est AA >60 >60 ml/min/1.73m2    GFR est non-AA 58 (L) >60 ml/min/1.73m2    Calcium 9.2 8.5 - 10.1 mg/dL    Bilirubin, total 1.1 (H) 0.2 - 1.0 mg/dL    AST (SGOT) 24 15 - 37 U/L    ALT (SGPT) 25 12 - 78 U/L    Alk.  phosphatase 49 45 - 117 U/L    Protein, total 7.9 6.4 - 8.2 g/dL    Albumin 4.4 3.5 - 5.0 g/dL    Globulin 3.5 2.0 - 4.0 g/dL    A-G Ratio 1.3 1.1 - 2.2     TROPONIN-HIGH SENSITIVITY    Collection Time: 06/19/22  5:40 PM   Result Value Ref Range    Troponin-High Sensitivity 174 (HH) 0 - 76 ng/L PROCALCITONIN    Collection Time: 06/19/22  8:45 PM   Result Value Ref Range    Procalcitonin <0.05 (H) 0 ng/mL       XR Results (maximum last 3): Results from Hospital Encounter encounter on 06/19/22    XR CHEST PORT    Narrative  Indication: Shortness of breath. AP portable upright chest 1809 hours 6/19/2022. Comparison 1/4/2022. Clear lungs. Normal heart size. No pneumothorax or pleural effusion. Minimal thoracic spondylosis. Impression  No acute abnormality. Results from [place] encounter on 01/04/22    XR CHEST SNGL V    Narrative  Chest single view. Comparison chest series January 20, 2020. Unchanged appearance for the lungs. No interstitial or alveolar pulmonary edema. Cardiac and mediastinal structures unchanged. Suspect great vessel ectasia  similar compared to prior imaging. Hypertrophic bone first costosternal margins. No pneumothorax or sizable pleural effusion. Nonacute left clavicle fracture. CT Results (maximum last 3): No results found for this or any previous visit. MRI Results (maximum last 3): No results found for this or any previous visit. Nuclear Medicine Results (maximum last 3): No results found for this or any previous visit. US Results (maximum last 3): No results found for this or any previous visit. Assessment and plan:   # COPD exacerbation  - Continue supplementary oxygen  - Nebulized bronchodilator treatment  - Steroids  - Azithromycin ordered  - Consult pulmonary service    # Hypertension  - Continue home medications. # CAD  -Continue DAPT    # Full code by default, need further clarification    # Medication list reviewed on Epic and/or outside documentation. Not reviewed with patient. I spent 40 minutes critical care time attending to this patient, including direct patient care, coordination of care with nurses, reviewing charts, imaging and results.      Signed By: Pauline Saenz MD     June 19, 2022

## 2022-06-20 NOTE — ED PROVIDER NOTES
EMERGENCY DEPARTMENT HISTORY AND PHYSICAL EXAM      Date: 6/19/2022  Patient Name: Chantel Mcclelland      History of Presenting Illness     Chief Complaint   Patient presents with    Shortness of Breath       History Provided By: Patient    HPI: Chantel Mcclelland, 80 y.o. male with a past medical history significant hypertension, hyperlipidemia and COPD presents to the ED with cc of shortness of breath. Patient reports symptoms have been ongoing over the course of the last week. Reports associated cough. Further history difficult to obtain secondary to severe respiratory distress upon arrival to the emergency department. There are no other complaints, changes, or physical findings at this time.     PCP: Luda Fletcher MD    Current Facility-Administered Medications   Medication Dose Route Frequency Provider Last Rate Last Admin    [START ON 6/20/2022] predniSONE (DELTASONE) tablet 20 mg  20 mg Oral BID WITH MEALS Martha Collins MD        albuterol-ipratropium (DUO-NEB) 2.5 MG-0.5 MG/3 ML  3 mL Nebulization Q4H PRN Mohit Rosales MD        [START ON 6/20/2022] allopurinoL (ZYLOPRIM) tablet 100 mg  100 mg Oral DAILY Martha Collins MD       [facility] [place] ON 6/20/2022] aspirin delayed-release tablet 81 mg  81 mg Oral DAILY Mohit Rosales MD       [facility] [place] ON 6/20/2022] atorvastatin (LIPITOR) tablet 20 mg  20 mg Oral DAILY Martha Collins MD        [START ON 6/20/2022] budesonide-formoterol (SYMBICORT) 80-4.5 mcg inhaler  2 Puff Inhalation DAILY Mohit Rosales MD       [facility] [place] ON 6/20/2022] clopidogreL (PLAVIX) tablet 75 mg  75 mg Oral DAILY Martha Collins MD  [facility] [place] ON 6/20/2022] cyanocobalamin tablet 1,000 mcg  1,000 mcg Oral DAILY Martha Collins MD       [facility] [place] ON 6/20/2022] isosorbide mononitrate ER (IMDUR) tablet 60 mg  60 mg Oral DAILY [facility] [place] [facility] 6/20/[facility]] losartan (COZAAR) tablet 50 mg  50 mg Oral DAILY Martha Collins MD        [START ON 6/20/2022] metoprolol succinate (TOPROL-XL) XL tablet 50 mg  50 mg Oral DAILY Arpita Collins MD  [facility] ON 6/20/2022] spironolactone (ALDACTONE) tablet 25 mg  25 mg Oral DAILY Arpita Collins MD        [START ON 6/20/2022] tiotropium bromide (SPIRIVA RESPIMAT) 2.5 mcg /actuation  2 Puff Inhalation DAILY Mazie Boas, MD        sodium chloride (NS) flush 5-40 mL  5-40 mL IntraVENous Q8H Arpita Collins MD        sodium chloride (NS) flush 5-40 mL  5-40 mL IntraVENous PRN Mazie Boas, MD        acetaminophen (TYLENOL) tablet 650 mg  650 mg Oral Q6H PRN Mazie Boas, MD        Or    acetaminophen (TYLENOL) suppository 650 mg  650 mg Rectal Q6H PRN Mazie Boas, MD        polyethylene glycol (MIRALAX) packet 17 g  17 g Oral DAILY PRN Mazie Boas, MD        ondansetron (ZOFRAN ODT) tablet 4 mg  4 mg Oral Q8H PRN Arpita Collins MD        Or    ondansetron (ZOFRAN) injection 4 mg  4 mg IntraVENous Q6H PRN Mazie Boas, MD        [START ON 6/20/2022] enoxaparin (LOVENOX) injection 40 mg  40 mg SubCUTAneous DAILY Arpita Collins MD       [facility] ON 6/20/2022] azithromycin (ZITHROMAX) 500 mg in 0.9% sodium chloride 250 mL ([id number])  500 mg IntraVENous Q24H Arpita Collins MD         Current Outpatient Medications   Medication Sig Dispense Refill    aspirin delayed-release 81 mg tablet Take 81 mg by mouth daily.  atorvastatin (LIPITOR) 40 mg tablet Take 20 mg by mouth.  clopidogreL (PLAVIX) 75 mg tab Take 75 mg by mouth daily.  fluticasone propion-salmeteroL (ADVAIR/WIXELA) 100-50 mcg/dose diskus inhaler Take 1 Puff by inhalation every twelve (12) hours.  albuterol (ProAir HFA) 90 mcg/actuation inhaler Take 2 Puffs by inhalation as needed for Wheezing or Cough.  budesonide-formoteroL (Symbicort) 80-4.5 mcg/actuation HFAA Take 2 Puffs by inhalation daily.  tiotropium bromide (Spiriva Respimat) 2.5 mcg/actuation inhaler Take 2 Puffs by inhalation daily.       allopurinoL (ZYLOPRIM) 100 mg tablet Take 100 mg by mouth daily.      cyanocobalamin (VITAMIN B12) 500 mcg tablet Take 1,000 mcg by mouth daily.  metoprolol succinate (TOPROL-XL) 50 mg XL tablet Take 50 mg by mouth daily.  multivitamin (ONE A DAY) tablet Take 1 Tablet by mouth daily.  rosuvastatin (CRESTOR) 40 mg tablet Take 20 mg by mouth daily.  spironolactone (ALDACTONE) 25 mg tablet Take 25 mg by mouth daily.  magnesium oxide 420 mg tab Take 420 mg by mouth daily as needed (leg cramps).  losartan (COZAAR) 50 mg tablet Take 50 mg by mouth daily.  cholecalciferol (VITAMIN D3) 25 mcg (1,000 unit) cap Take 1,000 Units by mouth daily. 2 tablets      omega-3 fatty acids/fish oil (FISH OIL OMEGA 3-6-9 PO) Take  by mouth daily.  isosorbide mononitrate ER (IMDUR) 120 mg CR tablet Take 120 mg by mouth daily. 1/2 tablet      sertraline HCl (SERTRALINE PO) Take  by mouth daily.          Past History     Past Medical History:  Past Medical History:   Diagnosis Date    CAD (coronary artery disease)     2 stents    Chest pain     States occasional CP x1 month    Chronic obstructive pulmonary disease (HCC)     History of blood transfusion     States from bleeding in stomach, from Plavix    Hypercholesteremia     Hypertension     Multiple lung nodules     Recent finding, follow up in 6 months    Pancreatitis     History of    PTSD (post-traumatic stress disorder)     Sleep apnea     no longer uses CPAP       Past Surgical History:  Past Surgical History:   Procedure Laterality Date    HX COLONOSCOPY      HI CARDIAC SURG PROCEDURE UNLIST      Cardiac cath, 2 stents       Family History:  Family History   Problem Relation Age of Onset    Diabetes Mother     Diabetes Father     Diabetes Sister     Diabetes Brother     Stroke Brother     Kidney Disease Brother        Social History:  Social History     Tobacco Use    Smoking status: Current Every Day Smoker    Smokeless tobacco: Never Used    Tobacco comment: Trying to quit, 5-6 cigarettes a day   Substance Use Topics    Alcohol use: Yes     Comment: 2-3 beers daily    Drug use: Never       Allergies:  No Known Allergies      Review of Systems     Review of Systems   Unable to perform ROS: Severe respiratory distress   Respiratory: Positive for shortness of breath. Physical Exam     Physical Exam  Constitutional:       General: He is not in acute distress. Appearance: Normal appearance. He is not ill-appearing. HENT:      Head: Normocephalic and atraumatic. Right Ear: External ear normal.      Left Ear: External ear normal.      Nose: Nose normal.      Mouth/Throat:      Mouth: Mucous membranes are moist.   Eyes:      Extraocular Movements: Extraocular movements intact. Conjunctiva/sclera: Conjunctivae normal.      Pupils: Pupils are equal, round, and reactive to light. Cardiovascular:      Rate and Rhythm: Normal rate and regular rhythm. Pulses: Normal pulses. Pulmonary:      Effort: Tachypnea and respiratory distress present. Breath sounds: Examination of the right-upper field reveals wheezing. Examination of the left-upper field reveals wheezing. Examination of the right-middle field reveals wheezing. Examination of the left-middle field reveals wheezing. Examination of the right-lower field reveals wheezing. Examination of the left-lower field reveals wheezing. Wheezing present. Abdominal:      General: Abdomen is flat. There is no distension. Musculoskeletal:         General: Normal range of motion. Cervical back: Normal range of motion. Skin:     General: Skin is warm and dry. Neurological:      General: No focal deficit present. Mental Status: He is alert and oriented to person, place, and time. Psychiatric:         Mood and Affect: Mood normal.         Behavior: Behavior normal.         Thought Content:  Thought content normal.         Judgment: Judgment normal.         Lab and Diagnostic Study Results     Labs - Recent Results (from the past 12 hour(s))   EKG, 12 LEAD, INITIAL    Collection Time: 06/19/22  5:32 PM   Result Value Ref Range    Ventricular Rate 85 BPM    Atrial Rate 85 BPM    P-R Interval 158 ms    QRS Duration 84 ms    Q-T Interval 350 ms    QTC Calculation (Bezet) 416 ms    Calculated P Axis 80 degrees    Calculated R Axis 80 degrees    Calculated T Axis 106 degrees    Diagnosis       Normal sinus rhythm with sinus arrhythmia  Normal ECG  When compared with ECG of 04-JAN-2022 18:04,  No significant change was found    Confirmed by Morgan Pringle ([id number]) on 6/19/2022 6:02:33 PM     CBC WITH AUTOMATED DIFF    Collection Time: 06/19/22  5:40 PM   Result Value Ref Range    WBC 16.3 (H) 4.1 - 11.1 K/uL    RBC 4.95 4.10 - 5.70 M/uL    HGB 14.9 12.1 - 17.0 g/dL    HCT 42.8 36.6 - 50.3 %    MCV 86.5 80.0 - 99.0 FL    MCH 30.1 26.0 - 34.0 PG    MCHC 34.8 30.0 - 36.5 g/dL    RDW 12.5 11.5 - 14.5 %    PLATELET 367 997 - 200 K/uL    MPV 9.7 8.9 - 12.9 FL    NRBC 0.0 0.0  WBC    ABSOLUTE NRBC 0.00 0.00 - 0.01 K/uL    NEUTROPHILS 85 (H) 32 - 75 %    LYMPHOCYTES 6 (L) 12 - 49 %    MONOCYTES 6 5 - 13 %    EOSINOPHILS 2 0 - 7 %    BASOPHILS 0 0 - 1 %    IMMATURE GRANULOCYTES 1 (H) 0 - 0.5 %    ABS. NEUTROPHILS 13.8 (H) 1.8 - 8.0 K/UL    ABS. LYMPHOCYTES 1.0 0.8 - 3.5 K/UL    ABS. MONOCYTES 1.0 0.0 - 1.0 K/UL    ABS. EOSINOPHILS 0.3 0.0 - 0.4 K/UL    ABS. BASOPHILS 0.1 0.0 - 0.1 K/UL    ABS. IMM.  GRANS. 0.1 (H) 0.00 - 0.04 K/UL    DF AUTOMATED     METABOLIC PANEL, COMPREHENSIVE    Collection Time: 06/19/22  5:40 PM   Result Value Ref Range    Sodium 132 (L) 136 - 145 mmol/L    Potassium 4.5 3.5 - 5.1 mmol/L    Chloride 100 97 - 108 mmol/L    CO2 25 21 - 32 mmol/L    Anion gap 7 5 - 15 mmol/L    Glucose 125 (H) 65 - 100 mg/dL    BUN 22 (H) 6 - 20 mg/dL    Creatinine 1.23 0.70 - 1.30 mg/dL    BUN/Creatinine ratio 18 12 - 20      GFR est AA >60 >60 ml/min/1.73m2    GFR est non-AA 58 (L) >60 ml/min/1.73m2    Calcium 9.2 8.5 - 10.1 mg/dL    Bilirubin, total 1.1 (H) 0.2 - 1.0 mg/dL    AST (SGOT) 24 15 - 37 U/L    ALT (SGPT) 25 12 - 78 U/L    Alk. phosphatase 49 45 - 117 U/L    Protein, total 7.9 6.4 - 8.2 g/dL    Albumin 4.4 3.5 - 5.0 g/dL    Globulin 3.5 2.0 - 4.0 g/dL    A-G Ratio 1.3 1.1 - 2.2     TROPONIN-HIGH SENSITIVITY    Collection Time: 06/19/22  5:40 PM   Result Value Ref Range    Troponin-High Sensitivity 174 (HH) 0 - 76 ng/L       Radiologic Studies -   [unfilled]  CT Results  (Last 48 hours)    None        CXR Results  (Last 48 hours)               06/19/22 1813  XR CHEST PORT Final result    Impression:      No acute abnormality. Narrative: Indication: Shortness of breath. AP portable upright chest 1809 hours 6/19/2022. Comparison 1/4/2022. Clear lungs. Normal heart size. No pneumothorax or pleural effusion. Minimal thoracic spondylosis. Medical Decision Making and ED Course   - I am the first and primary provider for this patient AND AM THE PRIMARY PROVIDER OF RECORD. - I reviewed the vital signs, available nursing notes, past medical history, past surgical history, family history and social history. - Initial assessment performed. The patients presenting problems have been discussed, and the staff are in agreement with the care plan formulated and outlined with them. I have encouraged them to ask questions as they arise throughout their visit. Vital Signs-Reviewed the patient's vital signs.     Patient Vitals for the past 12 hrs:   Temp Pulse Resp BP SpO2   06/19/22 1919 -- -- -- -- 98 %   06/19/22 1851 -- -- -- -- 99 %   06/19/22 1850 -- 74 22 110/75 96 %   06/19/22 1841 -- 84 20 (!) 107/52 93 %   06/19/22 1722 98 °F (36.7 °C) 84 30 131/72 90 %           Records Reviewed: Nursing Notes    The patient presents with shortness of breath with a differential diagnosis of COPD exacerbation, CHF, pneumonia, ACS    EKG interpretation: (Preliminary): Performed at 1732, and read at 1735  Sinus rhythm with a ventricular rate 85, , QRS 84, QTc 416 without evidence of ST depression or elevation. Sinus arrhythmia    ED Course:              Provider Notes (Medical Decision Making):   81-year-old male with past medical history significant for CAD, hypertension, COPD presenting to the emergency department for evaluation of shortness of breath x1 week. Upon arrival to the ED, patient noted to be tachypneic and hypoxic with SPO2 in the low 90s. He is diaphoretic with bilateral wheezing. Patient placed on BiPAP, given 125 mg Solu-Medrol and 3 breathing treatments with significant improvement of his symptoms. Chest x-ray without evidence of pulmonary infiltrate. Given suspected COPD exacerbation, will initiate doxycycline. Patient with leukocytosis of 18,000. Troponin mildly elevated at 174. Patient with history of CAD and no prior baseline, however is not complaining of any chest pain at this time. Signed out to admitting physician, Dr. Jasvir Paris  [facility]           Consultations:       Consultations: -  Hospitalist Consultant: Dr. Tay Mediate: We have asked for emergent assistance with regard to this patient. We have discussed the patients HPI, ROS, PE and results this far. They will come and evaluate the patient for admission. Procedures and Critical Care       Performed by: Dory Lama DO  PROCEDURES:  Procedures       CRITICAL CARE NOTE :  8:43 PM  Amount of Critical Care Time: 35(minutes)    IMPENDING DETERIORATION -Respiratory  ASSOCIATED RISK FACTORS - Hypoxia  MANAGEMENT- Bedside Assessment  INTERPRETATION -  Xrays and ECG  INTERVENTIONS - NIPPV initiation, multiple breathing tx  CASE REVIEW - Hospitalist/Intensivist and Nursing  TREATMENT RESPONSE -Improved and Stable  PERFORMED BY - Self    NOTES   :  I have spent critical care time involved in lab review, consultations with specialist, family decision- making, bedside attention and documentation.  This time excludes time spent in any separate billed procedures. During this entire length of time I was immediately available to the patient . Jearlean Boeck, DO        Disposition     Disposition: Admitted to ICU Medical ICU the case was discussed with the admitting physician     Admitted      Diagnosis     Clinical Impression:   1. COPD exacerbation [facility])        Attestations:    Jearlean Boeck, DO    Please note that this dictation was completed with [employer], the computer voice recognition software. Quite often unanticipated grammatical, syntax, homophones, and other interpretive errors are inadvertently transcribed by the computer software. Please disregard these errors. Please excuse any errors that have escaped final proofreading. Thank you.

## 2022-06-20 NOTE — PROGRESS NOTES
Hospitalist Progress Note               Daily Progress Note: 6/20/2022      Subjective:   Hospital course to date:  Patient is a 45-year-old male with a history of COPD, CAD and hypertension who presented to the ED on the evening of 6/19 with shortness of breath and cough x1 week. Upon arrival to the ED he was in respiratory distress and started on noninvasive ventilation ABG did not show significant hypercapnia. Chest x-ray was clear. Patient was admitted to the ICU for COPD exacerbation, continued on noninvasive ventilation    It appears patient is normally followed by the [place]    --------    Patient is seen today for follow-up. He continues on noninvasive ventilation.   When taken off for only a brief time he immediately desaturates    Problem List:  Problem List as of 6/20/2022 Never Reviewed          Codes Class Noted - Resolved    [facility] ICD-10-CM: J44.1  ICD-9-CM: 491.21  6/19/2022 - Present        Chest pain ICD-10-CM: R07.9  ICD-9-CM: 786.50  11/12/2021 - Present        CAD (coronary artery disease) ICD-10-CM: I25.10  ICD-9-CM: 414.00  11/12/2021 - Present              Medications reviewed  Current Facility-Administered Medications   Medication Dose Route Frequency    LORazepam (ATIVAN) injection 1 mg  1 mg IntraVENous Q8H PRN    predniSONE (DELTASONE) tablet 20 mg  20 mg Oral BID WITH MEALS    albuterol-ipratropium (DUO-NEB) 2.5 MG-0.5 MG/3 ML  3 mL Nebulization Q4H PRN    allopurinoL (ZYLOPRIM) tablet 100 mg  100 mg Oral DAILY    aspirin delayed-release tablet 81 mg  81 mg Oral DAILY    atorvastatin (LIPITOR) tablet 20 mg  20 mg Oral DAILY    budesonide-formoterol (SYMBICORT) 80-4.5 mcg inhaler  2 Puff Inhalation DAILY    clopidogreL (PLAVIX) tablet 75 mg  75 mg Oral DAILY    cyanocobalamin tablet 1,000 mcg  1,000 mcg Oral DAILY    isosorbide mononitrate ER (IMDUR) tablet 60 mg  60 mg Oral DAILY    losartan (COZAAR) tablet 50 mg  50 mg Oral DAILY    metoprolol succinate (TOPROL-XL) XL tablet 50 mg  50 mg Oral DAILY    spironolactone (ALDACTONE) tablet 25 mg  25 mg Oral DAILY    tiotropium bromide (SPIRIVA RESPIMAT) 2.5 mcg /actuation  2 Puff Inhalation DAILY    sodium chloride (NS) flush 5-40 mL  5-40 mL IntraVENous Q8H    sodium chloride (NS) flush 5-40 mL  5-40 mL IntraVENous PRN    acetaminophen (TYLENOL) tablet 650 mg  650 mg Oral Q6H PRN    Or    acetaminophen (TYLENOL) suppository 650 mg  650 mg Rectal Q6H PRN    polyethylene glycol (MIRALAX) packet 17 g  17 g Oral DAILY PRN    ondansetron (ZOFRAN ODT) tablet 4 mg  4 mg Oral Q8H PRN    Or    ondansetron (ZOFRAN) injection 4 mg  4 mg IntraVENous Q6H PRN    enoxaparin (LOVENOX) injection 40 mg  40 mg SubCUTAneous DAILY    azithromycin (ZITHROMAX) 500 mg in 0.9% sodium chloride 250 mL ([id number])  500 mg IntraVENous Q24H       Review of Systems:   A comprehensive review of systems was negative except for that written in the HPI. Objective:   Physical Exam:     Visit Vitals  BP (!) 141/90 (BP 1 Location: Right upper arm, BP Patient Position: At rest)   Pulse 84   Temp 98 °F (36.7 °C)   Resp 29   Ht 5' 4\" (1.626 m)   Wt 67.1 kg (148 lb)   SpO2 96%   BMI 25.40 kg/m²      O2 Device: BIPAP    Temp (24hrs), Av °F (36.7 °C), Min:97.3 °F (36.3 °C), Max:99 °F (37.2 °C)    No intake/output data recorded.  1901 -  0700  In: 100 [I.V.:100]  Out: -     General:   Awake and alert   Lungs:    Rhonchi and scattered expiratory wheezes bilateral   Chest wall:  No tenderness or deformity. Heart:  Regular rate and rhythm, S1, S2 normal, no murmur, click, rub or gallop. Abdomen:   Soft, non-tender. Bowel sounds normal. No masses,  No organomegaly. Extremities: Extremities normal, atraumatic, no cyanosis or edema. Pulses: 2+ and symmetric all extremities. Skin: Skin color, texture, turgor normal. No rashes or lesions   Neurologic: CNII-XII intact.   No gross focal deficits         Data Review:       Recent Days:  Recent Labs     06/20/22  0459 06/19/22  1740   WBC 9.5 16.3*   HGB 14.1 14.9   HCT 40.7 42.8    210     Recent Labs     06/20/22  0459 06/19/22  1740   * 132*   K 4.7 4.5    100   CO2 25 25   * 125*   BUN 24* 22*   CREA 0.99 1.23   CA 8.7 9.2   ALB  --  4.4   TBILI  --  1.1*   ALT  --  25     Recent Labs     06/20/22  0554   PH 7.36   PCO2 43   PO2 93   HCO3 24   FIO2 40       24 Hour Results:  Recent Results (from the past 24 hour(s))   EKG, 12 LEAD, INITIAL    Collection Time: 06/19/22  5:32 PM   Result Value Ref Range    Ventricular Rate 85 BPM    Atrial Rate 85 BPM    P-R Interval 158 ms    QRS Duration 84 ms    Q-T Interval 350 ms    QTC Calculation (Bezet) 416 ms    Calculated P Axis 80 degrees    Calculated R Axis 80 degrees    Calculated T Axis 106 degrees    Diagnosis       Normal sinus rhythm with sinus arrhythmia  Normal ECG  When compared with ECG of 04-JAN-2022 18:04,  No significant change was found    Confirmed by Morgan Pringle ([id number]) on 6/19/2022 6:02:33 PM     CBC WITH AUTOMATED DIFF    Collection Time: 06/19/22  5:40 PM   Result Value Ref Range    WBC 16.3 (H) 4.1 - 11.1 K/uL    RBC 4.95 4.10 - 5.70 M/uL    HGB 14.9 12.1 - 17.0 g/dL    HCT 42.8 36.6 - 50.3 %    MCV 86.5 80.0 - 99.0 FL    MCH 30.1 26.0 - 34.0 PG    MCHC 34.8 30.0 - 36.5 g/dL    RDW 12.5 11.5 - 14.5 %    PLATELET 008 710 - 421 K/uL    MPV 9.7 8.9 - 12.9 FL    NRBC 0.0 0.0  WBC    ABSOLUTE NRBC 0.00 0.00 - 0.01 K/uL    NEUTROPHILS 85 (H) 32 - 75 %    LYMPHOCYTES 6 (L) 12 - 49 %    MONOCYTES 6 5 - 13 %    EOSINOPHILS 2 0 - 7 %    BASOPHILS 0 0 - 1 %    IMMATURE GRANULOCYTES 1 (H) 0 - 0.5 %    ABS. NEUTROPHILS 13.8 (H) 1.8 - 8.0 K/UL    ABS. LYMPHOCYTES 1.0 0.8 - 3.5 K/UL    ABS. MONOCYTES 1.0 0.0 - 1.0 K/UL    ABS. EOSINOPHILS 0.3 0.0 - 0.4 K/UL    ABS. BASOPHILS 0.1 0.0 - 0.1 K/UL    ABS. IMM.  GRANS. 0.1 (H) 0.00 - 0.04 K/UL    DF AUTOMATED     METABOLIC PANEL, COMPREHENSIVE    Collection Time: 06/19/22  5:40 PM   Result Value Ref Range    Sodium 132 (L) 136 - 145 mmol/L    Potassium 4.5 3.5 - 5.1 mmol/L    Chloride 100 97 - 108 mmol/L    CO2 25 21 - 32 mmol/L    Anion gap 7 5 - 15 mmol/L    Glucose 125 (H) 65 - 100 mg/dL    BUN 22 (H) 6 - 20 mg/dL    Creatinine 1.23 0.70 - 1.30 mg/dL    BUN/Creatinine ratio 18 12 - 20      GFR est AA >60 >60 ml/min/1.73m2    GFR est non-AA 58 (L) >60 ml/min/1.73m2    Calcium 9.2 8.5 - 10.1 mg/dL    Bilirubin, total 1.1 (H) 0.2 - 1.0 mg/dL    AST (SGOT) 24 15 - 37 U/L    ALT (SGPT) 25 12 - 78 U/L    Alk. phosphatase 49 45 - 117 U/L    Protein, total 7.9 6.4 - 8.2 g/dL    Albumin 4.4 3.5 - 5.0 g/dL    Globulin 3.5 2.0 - 4.0 g/dL    A-G Ratio 1.3 1.1 - 2.2     TROPONIN-HIGH SENSITIVITY    Collection Time: 06/19/22  5:40 PM   Result Value Ref Range    Troponin-High Sensitivity 174 (HH) 0 - 76 ng/L   PROCALCITONIN    Collection Time: 06/19/22  8:45 PM   Result Value Ref Range    Procalcitonin <0.05 (H) 0 ng/mL   MRSA SCREEN - PCR (NASAL)    Collection Time: 06/20/22  4:44 AM   Result Value Ref Range    MRSA by PCR, Nasal Not Detected Not Detected     CBC WITH AUTOMATED DIFF    Collection Time: 06/20/22  4:59 AM   Result Value Ref Range    WBC 9.5 4.1 - 11.1 K/uL    RBC 4.72 4.10 - 5.70 M/uL    HGB 14.1 12.1 - 17.0 g/dL    HCT 40.7 36.6 - 50.3 %    MCV 86.2 80.0 - 99.0 FL    MCH 29.9 26.0 - 34.0 PG    MCHC 34.6 30.0 - 36.5 g/dL    RDW 12.6 11.5 - 14.5 %    PLATELET 131 692 - 587 K/uL    MPV 9.8 8.9 - 12.9 FL    NRBC 0.0 0.0  WBC    ABSOLUTE NRBC 0.00 0.00 - 0.01 K/uL    NEUTROPHILS 94 (H) 32 - 75 %    LYMPHOCYTES 5 (L) 12 - 49 %    MONOCYTES 1 (L) 5 - 13 %    EOSINOPHILS 0 0 - 7 %    BASOPHILS 0 0 - 1 %    IMMATURE GRANULOCYTES 0 0 - 0.5 %    ABS. NEUTROPHILS 8.9 (H) 1.8 - 8.0 K/UL    ABS. LYMPHOCYTES 0.5 (L) 0.8 - 3.5 K/UL    ABS. MONOCYTES 0.1 0.0 - 1.0 K/UL    ABS. EOSINOPHILS 0.0 0.0 - 0.4 K/UL    ABS. BASOPHILS 0.0 0.0 - 0.1 K/UL    ABS. IMM.  GRANS. 0.0 0.00 - 0.04 K/UL    DF AUTOMATED     METABOLIC PANEL, BASIC    Collection Time: 06/20/22  4:59 AM   Result Value Ref Range    Sodium 135 (L) 136 - 145 mmol/L    Potassium 4.7 3.5 - 5.1 mmol/L    Chloride 102 97 - 108 mmol/L    CO2 25 21 - 32 mmol/L    Anion gap 8 5 - 15 mmol/L    Glucose 181 (H) 65 - 100 mg/dL    BUN 24 (H) 6 - 20 mg/dL    Creatinine 0.99 0.70 - 1.30 mg/dL    BUN/Creatinine ratio 24 (H) 12 - 20      GFR est AA >60 >60 ml/min/1.73m2    GFR est non-AA >60 >60 ml/min/1.73m2    Calcium 8.7 8.5 - 10.1 mg/dL   TROPONIN-HIGH SENSITIVITY    Collection Time: 06/20/22  4:59 AM   Result Value Ref Range    Troponin-High Sensitivity 126 (HH) 0 - 76 ng/L   BLOOD GAS, ARTERIAL    Collection Time: 06/20/22  5:54 AM   Result Value Ref Range    pH 7.36 7.35 - 7.45      PCO2 43 35 - 45 mmHg    PO2 93 75 - 100 mmHg    O2 SATURATION 97 >95 %    BICARBONATE 24 22 - 26 mmol/L    BASE DEFICIT 1.9 0 - 2 mmol/L    O2 METHOD BiPAP      FIO2 40 %    Tidal volume 475      SET RATE 8      PEEP/CPAP 5      High PEEP 20      Sample source Arterial      SITE Right Radial      ANDREW'S TEST Positive      Performed by [id number]         XR CHEST PORT   Final Result      No acute abnormality. Assessment:  Acute exacerbation of COPD    Acute respiratory failure with hypoxia    Obstructive sleep apnea    History of lung nodules, details unclear    Anxiety and PTSD    Essential hypertension    [facility]        Plan:  Continue treatment for COPD including IV steroids and DuoNeb treatments  Continue noninvasive ventilation, wean as tolerated  Pulmonology to evaluate      Care Plan discussed with: Patient/Family    Disposition: Continued ICU care    Total time spent with patient: 30 minutes.     Riki Paez MD

## 2022-06-20 NOTE — ROUTINE PROCESS
[facility] TRANSFER - OUT REPORT:    Verbal report given tockarthikeyanu(name) on [address] Luo  being transferred to cvicu(unit) for routine progression of care       Report consisted of patients Situation, Background, Assessment and   Recommendations(SBAR). Information from the following report(s) SBAR, ED Summary and MAR was reviewed with the receiving nurse. Lines:   Peripheral IV 06/19/22 Left Antecubital (Active)       Peripheral IV 06/19/22 Anterior; Left Forearm (Active)   Site Assessment Clean, dry, & intact 06/19/22 2126   Phlebitis Assessment 0 06/19/22 2126   Infiltration Assessment 0 06/19/22 2126   Dressing Status Clean, dry, & intact 06/19/22 2126   Dressing Type Transparent;Tape 06/19/22 2126   Hub Color/Line Status Pink;Patent; Flushed 06/19/22 2126        Opportunity for questions and clarification was provided.       Patient transported with:   Monitor  O2 @ 40% liters  Tech

## 2022-06-21 LAB
ANION GAP SERPL CALC-SCNC: 6 MMOL/L (ref 5–15)
ARTERIAL PATENCY WRIST A: ABNORMAL
ARTERIAL PATENCY WRIST A: YES
BASE DEFICIT BLDA-SCNC: 1 MMOL/L
BASE DEFICIT BLDA-SCNC: 1.9 MMOL/L
BDY SITE: ABNORMAL
BDY SITE: ABNORMAL
BODY TEMPERATURE: 98.6
BODY TEMPERATURE: 98.7
BUN SERPL-MCNC: 40 MG/DL (ref 6–20)
BUN/CREAT SERPL: 34 (ref 12–20)
CA-I BLD-MCNC: 9.1 MG/DL (ref 8.5–10.1)
CHLORIDE SERPL-SCNC: 104 MMOL/L (ref 97–108)
CO2 SERPL-SCNC: 24 MMOL/L (ref 21–32)
COHGB MFR BLD: 0.5 % (ref 1–2)
COHGB MFR BLD: 0.6 % (ref 1–2)
CREAT SERPL-MCNC: 1.16 MG/DL (ref 0.7–1.3)
FIO2 ON VENT: 36 %
FIO2 ON VENT: 40 %
GAS FLOW.O2 O2 DELIVERY SYS: 4 L/MIN
GAS FLOW.O2 SETTING OXYMISER: 8 L/MIN
GLUCOSE SERPL-MCNC: 139 MG/DL (ref 65–100)
HCO3 BLDA-SCNC: 24 MMOL/L (ref 22–26)
HCO3 BLDA-SCNC: 25 MMOL/L (ref 22–26)
IPAP/PIP, IPAPIP: 11.5
METHGB MFR BLD: 0.4 % (ref 0–1.4)
METHGB MFR BLD: 0.4 % (ref 0–1.4)
OXYHGB MFR BLD: 94.8 % (ref 95–99)
OXYHGB MFR BLD: 95.6 % (ref 95–99)
PCO2 BLDA: 43 MMHG (ref 35–45)
PCO2 BLDA: 47 MMHG (ref 35–45)
PEEP MAX SETTING VENT: 20 CM[H2O]
PEEP RESPIRATORY: 5 CM[H2O]
PERFORMED BY, TECHID: 5613
PERFORMED BY, TECHID: ABNORMAL
PH BLDA: 7.34 [PH] (ref 7.35–7.45)
PH BLDA: 7.36 [PH] (ref 7.35–7.45)
PO2 BLDA: 83 MMHG (ref 80–100)
PO2 BLDA: 94 MMHG (ref 80–100)
POTASSIUM SERPL-SCNC: 5 MMOL/L (ref 3.5–5.1)
SAO2 % BLD: 96 % (ref 95–99)
SAO2 % BLD: 97 % (ref 95–99)
SAO2% DEVICE SAO2% SENSOR NAME: ABNORMAL
SAO2% DEVICE SAO2% SENSOR NAME: ABNORMAL
SODIUM SERPL-SCNC: 134 MMOL/L (ref 136–145)
SPECIMEN SITE: ABNORMAL
SPECIMEN SITE: ABNORMAL
VENTILATION MODE VENT: ABNORMAL
VT SETTING VENT: 475 ML

## 2022-06-21 PROCEDURE — 74011250637 HC RX REV CODE- 250/637: Performed by: INTERNAL MEDICINE

## 2022-06-21 PROCEDURE — 65270000029 HC RM PRIVATE

## 2022-06-21 PROCEDURE — 77010033678 HC OXYGEN DAILY

## 2022-06-21 PROCEDURE — 74011000250 HC RX REV CODE- 250: Performed by: INTERNAL MEDICINE

## 2022-06-21 PROCEDURE — 74011636637 HC RX REV CODE- 636/637: Performed by: INTERNAL MEDICINE

## 2022-06-21 PROCEDURE — 94640 AIRWAY INHALATION TREATMENT: CPT

## 2022-06-21 PROCEDURE — 74011250636 HC RX REV CODE- 250/636: Performed by: INTERNAL MEDICINE

## 2022-06-21 PROCEDURE — 36600 WITHDRAWAL OF ARTERIAL BLOOD: CPT

## 2022-06-21 PROCEDURE — 82803 BLOOD GASES ANY COMBINATION: CPT

## 2022-06-21 PROCEDURE — 80048 BASIC METABOLIC PNL TOTAL CA: CPT

## 2022-06-21 PROCEDURE — 36415 COLL VENOUS BLD VENIPUNCTURE: CPT

## 2022-06-21 RX ORDER — ALBUTEROL SULFATE 90 UG/1
2 AEROSOL, METERED RESPIRATORY (INHALATION)
Status: DISCONTINUED | OUTPATIENT
Start: 2022-06-21 | End: 2022-06-23 | Stop reason: HOSPADM

## 2022-06-21 RX ORDER — IPRATROPIUM BROMIDE AND ALBUTEROL SULFATE 2.5; .5 MG/3ML; MG/3ML
3 SOLUTION RESPIRATORY (INHALATION)
Status: DISCONTINUED | OUTPATIENT
Start: 2022-06-21 | End: 2022-06-23 | Stop reason: HOSPADM

## 2022-06-21 RX ORDER — IPRATROPIUM BROMIDE AND ALBUTEROL SULFATE 2.5; .5 MG/3ML; MG/3ML
3 SOLUTION RESPIRATORY (INHALATION)
Status: DISCONTINUED | OUTPATIENT
Start: 2022-06-21 | End: 2022-06-21

## 2022-06-21 RX ADMIN — PREDNISONE 20 MG: 20 TABLET ORAL at 08:32

## 2022-06-21 RX ADMIN — ENOXAPARIN SODIUM 40 MG: 100 INJECTION SUBCUTANEOUS at 08:34

## 2022-06-21 RX ADMIN — PREDNISONE 20 MG: 20 TABLET ORAL at 16:48

## 2022-06-21 RX ADMIN — BUDESONIDE AND FORMOTEROL FUMARATE DIHYDRATE 2 PUFF: 80; 4.5 AEROSOL RESPIRATORY (INHALATION) at 08:09

## 2022-06-21 RX ADMIN — LOSARTAN POTASSIUM 50 MG: 50 TABLET, FILM COATED ORAL at 08:32

## 2022-06-21 RX ADMIN — TIOTROPIUM BROMIDE INHALATION SPRAY 2 PUFF: 3.12 SPRAY, METERED RESPIRATORY (INHALATION) at 08:09

## 2022-06-21 RX ADMIN — CLOPIDOGREL BISULFATE 75 MG: 75 TABLET ORAL at 08:33

## 2022-06-21 RX ADMIN — SPIRONOLACTONE 25 MG: 25 TABLET ORAL at 08:34

## 2022-06-21 RX ADMIN — IPRATROPIUM BROMIDE AND ALBUTEROL SULFATE 3 ML: .5; 2.5 SOLUTION RESPIRATORY (INHALATION) at 13:50

## 2022-06-21 RX ADMIN — METOPROLOL SUCCINATE 50 MG: 50 TABLET, EXTENDED RELEASE ORAL at 08:33

## 2022-06-21 RX ADMIN — SODIUM CHLORIDE, PRESERVATIVE FREE 10 ML: 5 INJECTION INTRAVENOUS at 06:01

## 2022-06-21 RX ADMIN — AZITHROMYCIN MONOHYDRATE 500 MG: 500 INJECTION, POWDER, LYOPHILIZED, FOR SOLUTION INTRAVENOUS at 19:37

## 2022-06-21 RX ADMIN — ATORVASTATIN CALCIUM 20 MG: 20 TABLET, FILM COATED ORAL at 08:33

## 2022-06-21 RX ADMIN — SODIUM CHLORIDE, PRESERVATIVE FREE 10 ML: 5 INJECTION INTRAVENOUS at 14:56

## 2022-06-21 RX ADMIN — ALLOPURINOL 100 MG: 100 TABLET ORAL at 08:32

## 2022-06-21 RX ADMIN — ALBUTEROL SULFATE 2 PUFF: 108 AEROSOL, METERED RESPIRATORY (INHALATION) at 08:09

## 2022-06-21 RX ADMIN — ASPIRIN 81 MG: 81 TABLET, COATED ORAL at 08:34

## 2022-06-21 RX ADMIN — ISOSORBIDE MONONITRATE 60 MG: 60 TABLET, EXTENDED RELEASE ORAL at 08:32

## 2022-06-21 RX ADMIN — SODIUM CHLORIDE, PRESERVATIVE FREE 10 ML: 5 INJECTION INTRAVENOUS at 21:36

## 2022-06-21 NOTE — PROGRESS NOTES
Pulmonary/ CC follow-up   Mr. Lashon Rosado a 80years old male who has known history of COPD from ongoing chronic smoking, history of coronary artery disease and hypertension. He works as a . He was working until few days ago when he started having symptoms of shortness of breath along with chest tightness and wheezing. This was associated with productive cough. He denies any high-grade fever or chills. Progressively his condition worsen and he had to come to the emergency department. Uses his inhalers at home but lately they have been ineffective. ER he was found to be in severe respiratory distress and had to be placed on BiPAP. Admitting chest x-ray did not show any significant lung consolidation. Subjective:     Patient examined at bedside  Shortness of breath has shown improvement. Denied any chest pain. Has been on nebulized albuterol and Atrovent every 4 hours. Switched to nasal cannula oxygen now.       Patient Active Problem List   Diagnosis Code    Chest pain R07.9    CAD (coronary artery disease) I25.10    [facility]     Past Medical History:   Diagnosis Date    CAD (coronary artery disease)     2 stents    Chest pain     States occasional CP x1 month    Chronic obstructive pulmonary disease (HCC)     History of blood transfusion     States from bleeding in stomach, from Plavix    Hypercholesteremia     Hypertension     Multiple lung nodules     Recent finding, follow up in 6 months    Pancreatitis     History of    PTSD (post-traumatic stress disorder)     Sleep apnea     no longer uses CPAP      Family History   Problem Relation Age of Onset    Diabetes Mother     Diabetes Father     Diabetes Sister     Diabetes Brother     Stroke Brother     Kidney Disease Brother       Social History     Tobacco Use    Smoking status: Current Every Day Smoker    Smokeless tobacco: Never Used    Tobacco comment: Trying to quit, 5-6 cigarettes a day Substance Use Topics    Alcohol use: Yes     Comment: 2-3 beers daily     Past Surgical History:   Procedure Laterality Date    HX COLONOSCOPY      [facility] CARDIAC SURG PROCEDURE UNLIST      Cardiac cath, 2 stents      Prior to Admission medications    Medication Sig Start Date End Date Taking? Authorizing Provider   aspirin delayed-release 81 mg tablet Take 81 mg by mouth daily. 22  Yes Provider, Historical   clopidogreL (PLAVIX) 75 mg tab Take 75 mg by mouth daily. 22  Yes Provider, Historical   fluticasone propion-salmeteroL (ADVAIR/WIXELA) 100-50 mcg/dose diskus inhaler Take 1 Puff by inhalation every twelve (12) hours. 22  Yes Provider, Historical   albuterol (ProAir HFA) 90 mcg/actuation inhaler Take 2 Puffs by inhalation as needed for Wheezing or Cough. Yes Provider, Historical   budesonide-formoteroL (Symbicort) 80-4.5 mcg/actuation HFAA Take 2 Puffs by inhalation daily. Yes Provider, Historical   tiotropium bromide (Spiriva Respimat) 2.5 mcg/actuation inhaler Take 2 Puffs by inhalation daily. Yes Provider, Historical   metoprolol succinate (TOPROL-XL) 50 mg XL tablet Take 50 mg by mouth daily. Yes Provider, Historical   losartan (COZAAR) 50 mg tablet Take 50 mg by mouth daily. Yes Provider, Historical   isosorbide mononitrate ER (IMDUR) 120 mg CR tablet Take 120 mg by mouth daily. 1/2 tablet   Yes Provider, Historical     No Known Allergies     Review of Systems:    Systems were reviewed. Positive pertinent findings are mentioned above  Rest of the examination review essentially unremarkable. Objective:   Blood pressure 139/65, pulse 63, temperature 98.2 °F (36.8 °C), resp. rate 19, height 5' 4\" (1.626 m), weight 67.1 kg (148 lb), SpO2 97 %. Temp (24hrs), Av.7 °F (36.5 °C), Min:97.3 °F (36.3 °C), Max:98.2 °F (36.8 °C)    XR CHEST PORT   Final Result      No acute abnormality.          Data Review:   Current Facility-Administered Medications   Medication Dose Route Frequency  albuterol-ipratropium (DUO-NEB) 2.5 MG-0.5 MG/3 ML  3 mL Nebulization Q4H PRN    albuterol (PROVENTIL HFA, VENTOLIN HFA, PROAIR HFA) inhaler 2 Puff  2 Puff Inhalation Q4H PRN    LORazepam (ATIVAN) injection 1 mg  1 mg IntraVENous Q8H PRN    predniSONE (DELTASONE) tablet 20 mg  20 mg Oral BID WITH MEALS    allopurinoL (ZYLOPRIM) tablet 100 mg  100 mg Oral DAILY    aspirin delayed-release tablet 81 mg  81 mg Oral DAILY    atorvastatin (LIPITOR) tablet 20 mg  20 mg Oral DAILY    budesonide-formoterol (SYMBICORT) 80-4.5 mcg inhaler  2 Puff Inhalation DAILY    clopidogreL (PLAVIX) tablet 75 mg  75 mg Oral DAILY    isosorbide mononitrate ER (IMDUR) tablet 60 mg  60 mg Oral DAILY    losartan (COZAAR) tablet 50 mg  50 mg Oral DAILY    metoprolol succinate (TOPROL-XL) XL tablet 50 mg  50 mg Oral DAILY    spironolactone (ALDACTONE) tablet 25 mg  25 mg Oral DAILY    tiotropium bromide (SPIRIVA RESPIMAT) 2.5 mcg /actuation  2 Puff Inhalation DAILY    sodium chloride (NS) flush 5-40 mL  5-40 mL IntraVENous Q8H    sodium chloride (NS) flush 5-40 mL  5-40 mL IntraVENous PRN    acetaminophen (TYLENOL) tablet 650 mg  650 mg Oral Q6H PRN    Or    acetaminophen (TYLENOL) suppository 650 mg  650 mg Rectal Q6H PRN    polyethylene glycol (MIRALAX) packet 17 g  17 g Oral DAILY PRN    ondansetron (ZOFRAN ODT) tablet 4 mg  4 mg Oral Q8H PRN    Or    ondansetron (ZOFRAN) injection 4 mg  4 mg IntraVENous Q6H PRN    enoxaparin (LOVENOX) injection 40 mg  40 mg SubCUTAneous DAILY    azithromycin (ZITHROMAX) 500 mg in 0.9% sodium chloride 250 mL ([id number])  500 mg IntraVENous Q24H        Exam:     This is an elderly male who is currently in respiratory distress. He is looking short of breath. He is alert and oriented x3. Head normocephalic and atraumatic, pupils are unresponsive to light clinic recommended therapy. Neck is supple. No cervical lymphadenopathy.   Thyroid not enlarged  Chest: Patient has prolonged expiratory phase of breathing with rhonchi and trace expiratory expiratory wheezing audible. Heart: S1-S2 normal  Abdomen: Soft, nontender, no visceromegaly  Extremities: No edema, cyanosis or clubbing  Neuro: No focal motor deficit. Recent Results (from the past 24 hour(s))   METABOLIC PANEL, BASIC    Collection Time: 06/21/22  3:35 AM   Result Value Ref Range    Sodium 134 (L) 136 - 145 mmol/L    Potassium 5.0 3.5 - 5.1 mmol/L    Chloride 104 97 - 108 mmol/L    CO2 24 21 - 32 mmol/L    Anion gap 6 5 - 15 mmol/L    Glucose 139 (H) 65 - 100 mg/dL    BUN 40 (H) 6 - 20 mg/dL    Creatinine 1.16 0.70 - 1.30 mg/dL    BUN/Creatinine ratio 34 (H) 12 - 20      GFR est AA >60 >60 ml/min/1.73m2    GFR est non-AA >60 >60 ml/min/1.73m2    Calcium 9.1 8.5 - 10.1 mg/dL   BLOOD GAS, ARTERIAL    Collection Time: 06/21/22  3:40 AM   Result Value Ref Range    pH 7.34 (L) 7.35 - 7.45      PCO2 47 (H) 35 - 45 mmHg    PO2 83 80 - 100 mmHg    O2 SATURATION 96 95 - 99 %    BICARBONATE 25 22 - 26 mmol/L    BASE DEFICIT 1.0 mmol/L    O2 METHOD Nasal Cannula      O2 FLOW RATE 4.00 L/min    FIO2 36.0 %    Sample source Arterial      SITE Left Brachial      ANDREW'S TEST NOT APPLICABLE      Carboxy-Hgb 0.5 (L) 1 - 2 %    Methemoglobin 0.4 0 - 1.4 %    Oxyhemoglobin 94.8 (L) 95 - 99 %    Performed by Shanti Yañez     TEMPERATURE 98.6         Impression: This is an elderly male who has known history of advanced COPD from ongoing smoking, history of hypertension, coronary artery disease who is admitted hospital because of acute hypoxic respiratory failure which did not improve with outpatient nebulizers and Advair therapy. He was in severe respiratory distress on admission in the ER and was placed on BiPAP. Plan:   1. Acute hypoxic and hypercapnic respiratory failure:  Is resulted because of acute exacerbation of COPD. Patient's blood gas on BiPAP at 20/5 40% FiO2 showed  7.3 4/47/83/24/96   he is on nasal cannula oxygen now.     2.  Acute exacerbation of COPD:  Patient has prolonged expiratory phase of breathing with coarse wheezing. His chest is tight. His chest x-ray did not show any lung consolidation. Continue nebulized albuterol and Atrovent every 2 hours which will be spaced to every 3 hours later today. He got started on systemic steroids with prednisone 20 mg twice daily  IV antibiotics will be continued. 3.  Coronary artery disease:  He does have history of coronary artery disease and he follows with his cardiologist Dr. Irving Jacobson. 4.  Hypertension  He is on his antihypertensive medication  5. Smoking:  He is advised to quit smoking. Counseling is done. Nicotine patch as prescribed. He will be on DVT prophylaxis during his hospital stay  More than 50 minutes were spent in patient's evaluation, management and decision making. .  Patient will get transferred out to the telemetry floor today  Thank you for involving me in the management of the patient    Chai Quinn MD  [facility]

## 2022-06-21 NOTE — PROGRESS NOTES
Hospitalist Progress Note               Daily Progress Note: 6/21/2022      Subjective:   Hospital course to date:  Patient is a 18-year-old male with a history of COPD, CAD and hypertension who presented to the ED on the evening of 6/19 with shortness of breath and cough x1 week. Upon arrival to the ED he was in respiratory distress and started on noninvasive ventilation ABG did not show significant hypercapnia. Chest x-ray was clear. Patient was admitted to the ICU for COPD exacerbation, continued on noninvasive ventilation    It appears patient is normally followed by the [address] [place] [address]    --------    Patient is seen today for follow-up. He is off BiPAP, breathing comfortably on a nasal cannula.   Says his breathing has improved      Problem List:  Problem List as of 6/21/2022 Never Reviewed          Codes Class Noted - Resolved    COP[facility] exacerbation ([facility] ICD-10-CM: J44.1  ICD-9-CM: 491.21  6/19/2022 - Present        Chest pain ICD-10-CM: R07.9  ICD-9-CM: 786.50  11/12/2021 - Present        CAD (coronary artery disease) ICD-10-CM: I25.10  ICD-9-CM: 414.00  11/12/2021 - Present              Medications reviewed  Current Facility-Administered Medications   Medication Dose Route Frequency    albuterol-ipratropium (DUO-NEB) 2.5 MG-0.5 MG/3 ML  3 mL Nebulization Q4H PRN    albuterol (PROVENTIL HFA, VENTOLIN HFA, PROAIR HFA) inhaler 2 Puff  2 Puff Inhalation Q4H PRN    LORazepam (ATIVAN) injection 1 mg  1 mg IntraVENous Q8H PRN    predniSONE (DELTASONE) tablet 20 mg  20 mg Oral BID WITH MEALS    allopurinoL (ZYLOPRIM) tablet 100 mg  100 mg Oral DAILY    aspirin delayed-release tablet 81 mg  81 mg Oral DAILY    atorvastatin (LIPITOR) tablet 20 mg  20 mg Oral DAILY    budesonide-formoterol (SYMBICORT) 80-4.5 mcg inhaler  2 Puff Inhalation DAILY    clopidogreL (PLAVIX) tablet 75 mg  75 mg Oral DAILY    isosorbide mononitrate ER (IMDUR) tablet 60 mg  60 mg Oral DAILY    losartan (COZAAR) tablet 50 mg  50 mg Oral DAILY    metoprolol succinate (TOPROL-XL) XL tablet 50 mg  50 mg Oral DAILY    spironolactone (ALDACTONE) tablet 25 mg  25 mg Oral DAILY    tiotropium bromide (SPIRIVA RESPIMAT) 2.5 mcg /actuation  2 Puff Inhalation DAILY    sodium chloride (NS) flush 5-40 mL  5-40 mL IntraVENous Q8H    sodium chloride (NS) flush 5-40 mL  5-40 mL IntraVENous PRN    acetaminophen (TYLENOL) tablet 650 mg  650 mg Oral Q6H PRN    Or    acetaminophen (TYLENOL) suppository 650 mg  650 mg Rectal Q6H PRN    polyethylene glycol (MIRALAX) packet 17 g  17 g Oral DAILY PRN    ondansetron (ZOFRAN ODT) tablet 4 mg  4 mg Oral Q8H PRN    Or    ondansetron (ZOFRAN) injection 4 mg  4 mg IntraVENous Q6H PRN    enoxaparin (LOVENOX) injection 40 mg  40 mg SubCUTAneous DAILY    azithromycin (ZITHROMAX) 500 mg in 0.9% sodium chloride 250 mL ([id number])  500 mg IntraVENous Q24H       Review of Systems:   A comprehensive review of systems was negative except for that written in the HPI. Objective:   Physical Exam:     Visit Vitals  /81 (BP 1 Location: Right upper arm, BP Patient Position: At rest)   Pulse (!) 57   Temp 98.2 °F (36.8 °C)   Resp 18   Ht 5' 4\" (1.626 m)   Wt 67.1 kg (148 lb)   SpO2 99%   BMI 25.40 kg/m²    O2 Flow Rate (L/min): 4 l/min O2 Device: Nasal cannula    Temp (24hrs), Av.7 °F (36.5 °C), Min:97.3 °F (36.3 °C), Max:98.2 °F (36.8 °C)    No intake/output data recorded.  1901 -  0700  In: 100 [I.V.:100]  Out: 1600 [Urine:1600]    General:   Awake and alert   Lungs:    Mild expiratory wheezing bilateral   Chest wall:  No tenderness or deformity. Heart:  Regular rate and rhythm, S1, S2 normal, no murmur, click, rub or gallop. Abdomen:   Soft, non-tender. Bowel sounds normal. No masses,  No organomegaly. Extremities: Extremities normal, atraumatic, no cyanosis or edema. Pulses: 2+ and symmetric all extremities.    Skin: Skin color, texture, turgor normal. No rashes or lesions   Neurologic: CNII-XII intact. No gross focal deficits         Data Review:       Recent Days:  Recent Labs     06/20/22  0459 06/19/22  1740   WBC 9.5 16.3*   HGB 14.1 14.9   HCT 40.7 42.8    210     Recent Labs     06/21/22  0335 06/20/22  0459 06/19/22  1740   * 135* 132*   K 5.0 4.7 4.5    102 100   CO2 24 25 25   * 181* 125*   BUN 40* 24* 22*   CREA 1.16 0.99 1.23   CA 9.1 8.7 9.2   ALB  --   --  4.4   TBILI  --   --  1.1*   ALT  --   --  25     Recent Labs     06/21/22  0340 06/20/22  0554   PH 7.34* 7.36   PCO2 47* 43   PO2 83 93   HCO3 25 24   FIO2 36.0 40       24 Hour Results:  Recent Results (from the past 24 hour(s))   METABOLIC PANEL, BASIC    Collection Time: 06/21/22  3:35 AM   Result Value Ref Range    Sodium 134 (L) 136 - 145 mmol/L    Potassium 5.0 3.5 - 5.1 mmol/L    Chloride 104 97 - 108 mmol/L    CO2 24 21 - 32 mmol/L    Anion gap 6 5 - 15 mmol/L    Glucose 139 (H) 65 - 100 mg/dL    BUN 40 (H) 6 - 20 mg/dL    Creatinine 1.16 0.70 - 1.30 mg/dL    BUN/Creatinine ratio 34 (H) 12 - 20      GFR est AA >60 >60 ml/min/1.73m2    GFR est non-AA >60 >60 ml/min/1.73m2    Calcium 9.1 8.5 - 10.1 mg/dL   BLOOD GAS, ARTERIAL    Collection Time: 06/21/22  3:40 AM   Result Value Ref Range    pH 7.34 (L) 7.35 - 7.45      PCO2 47 (H) 35 - 45 mmHg    PO2 83 80 - 100 mmHg    O2 SATURATION 96 95 - 99 %    BICARBONATE 25 22 - 26 mmol/L    BASE DEFICIT 1.0 mmol/L    O2 METHOD Nasal Cannula      O2 FLOW RATE 4.00 L/min    FIO2 36.0 %    Sample source Arterial      SITE Left Brachial      ANDREW'S TEST NOT APPLICABLE      Carboxy-Hgb 0.5 (L) 1 - 2 %    Methemoglobin 0.4 0 - 1.4 %    Oxyhemoglobin 94.8 (L) 95 - 99 %    Performed by Shanti Yañez     TEMPERATURE 98.6         XR CHEST PORT   Final Result      No acute abnormality.            Assessment:  Acute exacerbation of COPD, slowly improving    Acute respiratory failure with hypoxia, improved, now off noninvasive ventilation    Obstructive sleep apnea    History of lung nodules, details unclear    Anxiety and PTSD    Essential hypertension    [facility]        Plan:  Continue treatment for COPD including IV steroids and DuoNeb treatments    Transfer to medical floor    Care Plan discussed with: Patient/Family    Disposition: Continued ICU care    Total time spent with patient: 30 minutes.     Tania Romero MD

## 2022-06-22 PROCEDURE — 94761 N-INVAS EAR/PLS OXIMETRY MLT: CPT

## 2022-06-22 PROCEDURE — 77010033678 HC OXYGEN DAILY

## 2022-06-22 PROCEDURE — 74011250636 HC RX REV CODE- 250/636: Performed by: INTERNAL MEDICINE

## 2022-06-22 PROCEDURE — 65270000029 HC RM PRIVATE

## 2022-06-22 PROCEDURE — 74011000250 HC RX REV CODE- 250: Performed by: INTERNAL MEDICINE

## 2022-06-22 PROCEDURE — 94640 AIRWAY INHALATION TREATMENT: CPT

## 2022-06-22 PROCEDURE — 74011250637 HC RX REV CODE- 250/637: Performed by: INTERNAL MEDICINE

## 2022-06-22 PROCEDURE — 74011636637 HC RX REV CODE- 636/637: Performed by: INTERNAL MEDICINE

## 2022-06-22 RX ORDER — AZITHROMYCIN 500 MG/1
500 TABLET, FILM COATED ORAL DAILY
Status: DISCONTINUED | OUTPATIENT
Start: 2022-06-23 | End: 2022-06-23 | Stop reason: HOSPADM

## 2022-06-22 RX ADMIN — PREDNISONE 20 MG: 20 TABLET ORAL at 09:13

## 2022-06-22 RX ADMIN — SODIUM CHLORIDE, PRESERVATIVE FREE 10 ML: 5 INJECTION INTRAVENOUS at 21:55

## 2022-06-22 RX ADMIN — ATORVASTATIN CALCIUM 20 MG: 20 TABLET, FILM COATED ORAL at 09:13

## 2022-06-22 RX ADMIN — IPRATROPIUM BROMIDE AND ALBUTEROL SULFATE 3 ML: 2.5; .5 SOLUTION RESPIRATORY (INHALATION) at 20:21

## 2022-06-22 RX ADMIN — SODIUM CHLORIDE, PRESERVATIVE FREE 10 ML: 5 INJECTION INTRAVENOUS at 05:12

## 2022-06-22 RX ADMIN — ALLOPURINOL 100 MG: 100 TABLET ORAL at 09:12

## 2022-06-22 RX ADMIN — LOSARTAN POTASSIUM 50 MG: 50 TABLET, FILM COATED ORAL at 09:12

## 2022-06-22 RX ADMIN — ENOXAPARIN SODIUM 40 MG: 100 INJECTION SUBCUTANEOUS at 09:12

## 2022-06-22 RX ADMIN — METOPROLOL SUCCINATE 50 MG: 50 TABLET, EXTENDED RELEASE ORAL at 09:12

## 2022-06-22 RX ADMIN — ASPIRIN 81 MG: 81 TABLET, COATED ORAL at 09:13

## 2022-06-22 RX ADMIN — PREDNISONE 20 MG: 20 TABLET ORAL at 16:31

## 2022-06-22 RX ADMIN — CLOPIDOGREL BISULFATE 75 MG: 75 TABLET ORAL at 09:13

## 2022-06-22 RX ADMIN — SPIRONOLACTONE 25 MG: 25 TABLET ORAL at 09:13

## 2022-06-22 RX ADMIN — IPRATROPIUM BROMIDE AND ALBUTEROL SULFATE 3 ML: 2.5; .5 SOLUTION RESPIRATORY (INHALATION) at 13:38

## 2022-06-22 RX ADMIN — SODIUM CHLORIDE, PRESERVATIVE FREE 10 ML: 5 INJECTION INTRAVENOUS at 13:12

## 2022-06-22 RX ADMIN — IPRATROPIUM BROMIDE AND ALBUTEROL SULFATE 3 ML: 2.5; .5 SOLUTION RESPIRATORY (INHALATION) at 02:00

## 2022-06-22 RX ADMIN — BUDESONIDE AND FORMOTEROL FUMARATE DIHYDRATE 2 PUFF: 80; 4.5 AEROSOL RESPIRATORY (INHALATION) at 08:24

## 2022-06-22 RX ADMIN — IPRATROPIUM BROMIDE AND ALBUTEROL SULFATE 3 ML: 2.5; .5 SOLUTION RESPIRATORY (INHALATION) at 08:24

## 2022-06-22 RX ADMIN — ISOSORBIDE MONONITRATE 60 MG: 60 TABLET, EXTENDED RELEASE ORAL at 09:13

## 2022-06-22 NOTE — PROGRESS NOTES
Pulmonary/ CC follow-up   Mr. Angel Godoy a 80years old male who has known history of COPD from ongoing chronic smoking, history of coronary artery disease and hypertension. He works as a . He was working until few days ago when he started having symptoms of shortness of breath along with chest tightness and wheezing. This was associated with productive cough. He denies any high-grade fever or chills. Progressively his condition worsen and he had to come to the emergency department. Uses his inhalers at home but lately they have been ineffective. ER he was found to be in severe respiratory distress and had to be placed on BiPAP. Admitting chest x-ray did not show any significant lung consolidation. Subjective:     Patient examined at bedside  Shortness of breath has shown further  improvement. Denied any chest pain. Has been on nebulized albuterol and Atrovent every 6 hours. Switched to nasal cannula oxygen now. Complains that he is getting chopped food.       Patient Active Problem List   Diagnosis Code    Chest pain R07.9    CAD (coronary artery disease) I25.10    [facility]     Past Medical History:   Diagnosis Date    CAD (coronary artery disease)     2 stents    Chest pain     States occasional CP x1 month    Chronic obstructive pulmonary disease (HCC)     History of blood transfusion     States from bleeding in stomach, from Plavix    Hypercholesteremia     Hypertension     Multiple lung nodules     Recent finding, follow up in 6 months    Pancreatitis     History of    PTSD (post-traumatic stress disorder)     Sleep apnea     no longer uses CPAP      Family History   Problem Relation Age of Onset    Diabetes Mother     Diabetes Father     Diabetes Sister     Diabetes Brother     Stroke Brother     Kidney Disease Brother       Social History     Tobacco Use    Smoking status: Current Every Day Smoker    Smokeless tobacco: Never Used    Tobacco comment: Trying to quit, 5-6 cigarettes a day   Substance Use Topics    Alcohol use: Yes     Comment: 2-3 beers daily     Past Surgical History:   Procedure Laterality Date    HX COLONOSCOPY      [place] CARDIAC SURG PROCEDURE UNLIST      Cardiac cath, 2 stents      Prior to Admission medications    Medication Sig Start Date End Date Taking? Authorizing Provider   aspirin delayed-release 81 mg tablet Take 81 mg by mouth daily. 22  Yes Provider, Historical   clopidogreL (PLAVIX) 75 mg tab Take 75 mg by mouth daily. 22  Yes Provider, Historical   fluticasone propion-salmeteroL (ADVAIR/WIXELA) 100-50 mcg/dose diskus inhaler Take 1 Puff by inhalation every twelve (12) hours. 22  Yes Provider, Historical   albuterol (ProAir HFA) 90 mcg/actuation inhaler Take 2 Puffs by inhalation as needed for Wheezing or Cough. Yes Provider, Historical   budesonide-formoteroL (Symbicort) 80-4.5 mcg/actuation HFAA Take 2 Puffs by inhalation daily. Yes Provider, Historical   tiotropium bromide (Spiriva Respimat) 2.5 mcg/actuation inhaler Take 2 Puffs by inhalation daily. Yes Provider, Historical   metoprolol succinate (TOPROL-XL) 50 mg XL tablet Take 50 mg by mouth daily. Yes Provider, Historical   losartan (COZAAR) 50 mg tablet Take 50 mg by mouth daily. Yes Provider, Historical   isosorbide mononitrate ER (IMDUR) 120 mg CR tablet Take 120 mg by mouth daily. 1/2 tablet   Yes Provider, Historical     No Known Allergies     Review of Systems:    Systems were reviewed. Positive pertinent findings are mentioned above  Rest of the examination review essentially unremarkable. Objective:   Blood pressure 129/71, pulse 67, temperature 97.7 °F (36.5 °C), resp. rate 18, height 5' 4\" (1.626 m), weight 67.1 kg (148 lb), SpO2 97 %. Temp (24hrs), Av.8 °F (36.6 °C), Min:97.7 °F (36.5 °C), Max:98 °F (36.7 °C)    XR CHEST PORT   Final Result      No acute abnormality.          Data Review:   Current Facility-Administered Medications   Medication Dose Route Frequency    albuterol (PROVENTIL HFA, VENTOLIN HFA, PROAIR HFA) inhaler 2 Puff  2 Puff Inhalation Q4H PRN    albuterol-ipratropium (DUO-NEB) 2.5 MG-0.5 MG/3 ML  3 mL Nebulization Q6H RT    LORazepam (ATIVAN) injection 1 mg  1 mg IntraVENous Q8H PRN    predniSONE (DELTASONE) tablet 20 mg  20 mg Oral BID WITH MEALS    allopurinoL (ZYLOPRIM) tablet 100 mg  100 mg Oral DAILY    aspirin delayed-release tablet 81 mg  81 mg Oral DAILY    atorvastatin (LIPITOR) tablet 20 mg  20 mg Oral DAILY    budesonide-formoterol (SYMBICORT) 80-4.5 mcg inhaler  2 Puff Inhalation DAILY    clopidogreL (PLAVIX) tablet 75 mg  75 mg Oral DAILY    isosorbide mononitrate ER (IMDUR) tablet 60 mg  60 mg Oral DAILY    losartan (COZAAR) tablet 50 mg  50 mg Oral DAILY    metoprolol succinate (TOPROL-XL) XL tablet 50 mg  50 mg Oral DAILY    spironolactone (ALDACTONE) tablet 25 mg  25 mg Oral DAILY    sodium chloride (NS) flush 5-40 mL  5-40 mL IntraVENous Q8H    sodium chloride (NS) flush 5-40 mL  5-40 mL IntraVENous PRN    acetaminophen (TYLENOL) tablet 650 mg  650 mg Oral Q6H PRN    Or    acetaminophen (TYLENOL) suppository 650 mg  650 mg Rectal Q6H PRN    polyethylene glycol (MIRALAX) packet 17 g  17 g Oral DAILY PRN    ondansetron (ZOFRAN ODT) tablet 4 mg  4 mg Oral Q8H PRN    Or    ondansetron (ZOFRAN) injection 4 mg  4 mg IntraVENous Q6H PRN    enoxaparin (LOVENOX) injection 40 mg  40 mg SubCUTAneous DAILY    azithromycin (ZITHROMAX) 500 mg in 0.9% sodium chloride 250 mL ([id number])  500 mg IntraVENous Q24H        Exam:     This is an elderly male who is currently in respiratory distress. He is looking short of breath. He is alert and oriented x3. Head normocephalic and atraumatic, pupils are unresponsive to light clinic recommended therapy. Neck is supple. No cervical lymphadenopathy. Thyroid not enlarged  Chest: Wheezing has resolved.  Heart: S1-S2 normal  Abdomen: Soft, nontender, no visceromegaly  Extremities: No edema, cyanosis or clubbing  Neuro: No focal motor deficit. No results found for this or any previous visit (from the past 24 hour(s)). Impression: This is an elderly male who has known history of advanced COPD from ongoing smoking, history of hypertension, coronary artery disease who is admitted hospital because of acute hypoxic respiratory failure which did not improve with outpatient nebulizers and Advair therapy. He was in severe respiratory distress on admission in the ER and was placed on BiPAP. Plan:   1. Acute hypoxic and hypercapnic respiratory failure:  Is resulted because of acute exacerbation of COPD. Patient's blood gas on BiPAP at 20/5 40% FiO2 showed  7.3 4/47/83/24/96   he is on nasal cannula oxygen now. He should be ambulated in hallway on RA to assess his O2 needs. 2.  Acute exacerbation of COPD:  Patient has prolonged expiratory phase of breathing with coarse wheezing. His chest is tight. His chest x-ray did not show any lung consolidation. Continue nebulized albuterol and Atrovent every 2 hours which will be spaced to every 3 hours later today. He got started on systemic steroids with prednisone 20 mg twice daily  Switch to po abx. 3.  Coronary artery disease:  He does have history of coronary artery disease and he follows with his cardiologist Dr. Cindy Ndiaye. 4.  Hypertension  He is on his antihypertensive medication  5. Smoking:  He is advised to quit smoking. Counseling is done. Nicotine patch as prescribed. Pt is switched to regular consistency diet. He will be on DVT prophylaxis during his hospital stay  D/w covering nurse. Pt can be discharged in next 24 hours. Will see him in office in next week.       Alyce Saenz MD  [facility]

## 2022-06-22 NOTE — PROGRESS NOTES
Problem: Falls - Risk of  Goal: *Absence of Falls  Description: Document Padmini Suárez Fall Risk and appropriate interventions in the flowsheet. Outcome: Progressing Towards Goal  Note: Fall Risk Interventions:  Mobility Interventions: Bed/chair exit alarm         Medication Interventions: Bed/chair exit alarm         History of Falls Interventions: Bed/chair exit alarm         Problem: Pressure Injury - Risk of  Goal: *Prevention of pressure injury  Description: Document Obdulio Scale and appropriate interventions in the flowsheet.   Outcome: Progressing Towards Goal  Note: Pressure Injury Interventions:       Moisture Interventions: Minimize layers    Activity Interventions: Increase time out of bed    Mobility Interventions: PT/OT evaluation    Nutrition Interventions: Document food/fluid/supplement intake                     Problem: Breathing Pattern - Ineffective  Goal: *Absence of hypoxia  Outcome: Progressing Towards Goal

## 2022-06-22 NOTE — PROGRESS NOTES
CM discussed discharge planning with patient at bedside. Patient will discharge home self care. Patient is independent with ADL/IADL care. Patient continues to work. Patient family will assist as needed and transport home at discharge.

## 2022-06-23 VITALS
RESPIRATION RATE: 20 BRPM | DIASTOLIC BLOOD PRESSURE: 69 MMHG | OXYGEN SATURATION: 92 % | BODY MASS INDEX: 25.27 KG/M2 | HEART RATE: 59 BPM | WEIGHT: 148 LBS | TEMPERATURE: 97.6 F | HEIGHT: 64 IN | SYSTOLIC BLOOD PRESSURE: 128 MMHG

## 2022-06-23 PROCEDURE — 94640 AIRWAY INHALATION TREATMENT: CPT

## 2022-06-23 PROCEDURE — 74011000250 HC RX REV CODE- 250: Performed by: INTERNAL MEDICINE

## 2022-06-23 PROCEDURE — 74011250637 HC RX REV CODE- 250/637: Performed by: INTERNAL MEDICINE

## 2022-06-23 PROCEDURE — 74011636637 HC RX REV CODE- 636/637: Performed by: INTERNAL MEDICINE

## 2022-06-23 PROCEDURE — 74011250636 HC RX REV CODE- 250/636: Performed by: INTERNAL MEDICINE

## 2022-06-23 PROCEDURE — 77010033678 HC OXYGEN DAILY

## 2022-06-23 PROCEDURE — 94761 N-INVAS EAR/PLS OXIMETRY MLT: CPT

## 2022-06-23 RX ORDER — AZITHROMYCIN 500 MG/1
500 TABLET, FILM COATED ORAL DAILY
Qty: 4 TABLET | Refills: 0 | Status: SHIPPED | OUTPATIENT
Start: 2022-06-24 | End: 2022-06-28

## 2022-06-23 RX ORDER — PREDNISONE 20 MG/1
20 TABLET ORAL 2 TIMES DAILY WITH MEALS
Qty: 10 TABLET | Refills: 0 | Status: SHIPPED | OUTPATIENT
Start: 2022-06-23 | End: 2022-06-25

## 2022-06-23 RX ADMIN — IPRATROPIUM BROMIDE AND ALBUTEROL SULFATE 3 ML: 2.5; .5 SOLUTION RESPIRATORY (INHALATION) at 02:02

## 2022-06-23 RX ADMIN — BUDESONIDE AND FORMOTEROL FUMARATE DIHYDRATE 2 PUFF: 80; 4.5 AEROSOL RESPIRATORY (INHALATION) at 07:14

## 2022-06-23 RX ADMIN — SPIRONOLACTONE 25 MG: 25 TABLET ORAL at 09:34

## 2022-06-23 RX ADMIN — ATORVASTATIN CALCIUM 20 MG: 20 TABLET, FILM COATED ORAL at 09:34

## 2022-06-23 RX ADMIN — LOSARTAN POTASSIUM 50 MG: 50 TABLET, FILM COATED ORAL at 09:34

## 2022-06-23 RX ADMIN — ISOSORBIDE MONONITRATE 60 MG: 60 TABLET, EXTENDED RELEASE ORAL at 09:34

## 2022-06-23 RX ADMIN — SODIUM CHLORIDE, PRESERVATIVE FREE 10 ML: 5 INJECTION INTRAVENOUS at 06:16

## 2022-06-23 RX ADMIN — CLOPIDOGREL BISULFATE 75 MG: 75 TABLET ORAL at 09:32

## 2022-06-23 RX ADMIN — IPRATROPIUM BROMIDE AND ALBUTEROL SULFATE 3 ML: 2.5; .5 SOLUTION RESPIRATORY (INHALATION) at 07:14

## 2022-06-23 RX ADMIN — ASPIRIN 81 MG: 81 TABLET, COATED ORAL at 09:34

## 2022-06-23 RX ADMIN — METOPROLOL SUCCINATE 50 MG: 50 TABLET, EXTENDED RELEASE ORAL at 09:43

## 2022-06-23 RX ADMIN — ENOXAPARIN SODIUM 40 MG: 100 INJECTION SUBCUTANEOUS at 09:35

## 2022-06-23 RX ADMIN — ALLOPURINOL 100 MG: 100 TABLET ORAL at 09:32

## 2022-06-23 RX ADMIN — PREDNISONE 20 MG: 20 TABLET ORAL at 07:26

## 2022-06-23 RX ADMIN — AZITHROMYCIN MONOHYDRATE 500 MG: 500 TABLET ORAL at 09:34

## 2022-06-23 NOTE — ROUTINE PROCESS
Bedside shift change report given to [address] [place] [address] (oncoming nurse) by Elma Islas RN (offgoing nurse). Report included the following information SBAR, Intake/Output, MAR and Recent Results.

## 2022-06-23 NOTE — PROGRESS NOTES
Problem: Falls - Risk of  Goal: *Absence of Falls  Description: Document Becky Santiagoraimundo Fall Risk and appropriate interventions in the flowsheet. Outcome: Progressing Towards Goal  Note: Fall Risk Interventions:  Mobility Interventions: Patient to call before getting OOB         Medication Interventions: Teach patient to arise slowly         History of Falls Interventions: Bed/chair exit alarm         Problem: Pressure Injury - Risk of  Goal: *Prevention of pressure injury  Description: Document Obdulio Scale and appropriate interventions in the flowsheet.   Outcome: Progressing Towards Goal  Note: Pressure Injury Interventions:       Moisture Interventions: Minimize layers    Activity Interventions: Increase time out of bed    Mobility Interventions: HOB 30 degrees or less    Nutrition Interventions: Document food/fluid/supplement intake

## 2022-06-23 NOTE — PROGRESS NOTES
Patient will discharge home today self care. Family to assist as needed and transport at discharge. Primary nurse is aware. Discharge plan of care/case management plan validated with provider discharge order.

## 2022-06-23 NOTE — DISCHARGE SUMMARY
[facility]                   Physician Discharge Summary     Patient ID:    Candie Babin  [id number]  65 y.o.  1950    Admit date: 6/19/2022    Discharge date : 6/23/2022    Chronic Diagnoses:    Problem List as of 6/23/2022 Never Reviewed          Codes Class Noted - Resolved    COPD exacerbation ([facility] ICD-10-CM: J44.1  ICD-9-CM: 491.21  6/19/2022 - Present        Chest pain ICD-10-CM: R07.9  ICD-9-CM: 786.50  11/12/2021 - Present        CAD (coronary artery disease) ICD-10-CM: I25.10  ICD-9-CM: 414.00  11/12/2021 - Present          22    Final Diagnoses:   COPD exacerbation ([facility] [J44.1]  Acute respiratory failure with hypoxia, improved, now off noninvasive ventilation     Obstructive sleep apnea     History of lung nodules, details unclear     Anxiety and PTSD     Essential hypertension     History of alcohol abuse    Reason for Hospitalization:  Patient is a 56-year-old male with a history of COPD, CAD and hypertension who presented to the ED on the evening of 6/19 with shortness of breath and cough x1 week. Upon arrival to the ED he was in respiratory distress and started on noninvasive ventilation ABG did not show significant hypercapnia. Chest x-ray was clear. Hospital Course:   Admitted to the ICU. He was started on IV steroids, Zithromax meds    He was continued on noninvasive ventilation    He was seen by pulmonology    His condition improved and he was weaned off BiPAP. He was switched over to oral steroids. He was transferred to the floor and continued to do well. He was weaned off oxygen    He was felt stable for discharge home on 6/23              Discharge Medications:   Current Discharge Medication List      START taking these medications    Details   azithromycin (ZITHROMAX) 500 mg tab Take 1 Tablet by mouth daily for 4 doses.  Indications: bacterial infection with chronic bronchitis  Qty: 4 Tablet, Refills: 0  Start date: 6/24/2022, End date: 6/28/2022      predniSONE (Anglin Race) 20 mg tablet Take 1 Tablet by mouth two (2) times daily (with meals) for 3 doses. Qty: 10 Tablet, Refills: 0  Start date: 6/23/2022, End date: 6/25/2022         CONTINUE these medications which have NOT CHANGED    Details   aspirin delayed-release 81 mg tablet Take 81 mg by mouth daily. clopidogreL (PLAVIX) 75 mg tab Take 75 mg by mouth daily. fluticasone propion-salmeteroL (ADVAIR/WIXELA) 100-50 mcg/dose diskus inhaler Take 1 Puff by inhalation every twelve (12) hours. albuterol (ProAir HFA) 90 mcg/actuation inhaler Take 2 Puffs by inhalation as needed for Wheezing or Cough. budesonide-formoteroL (Symbicort) 80-4.5 mcg/actuation HFAA Take 2 Puffs by inhalation daily. tiotropium bromide (Spiriva Respimat) 2.5 mcg/actuation inhaler Take 2 Puffs by inhalation daily. metoprolol succinate (TOPROL-XL) 50 mg XL tablet Take 50 mg by mouth daily. losartan (COZAAR) 50 mg tablet Take 50 mg by mouth daily. isosorbide mononitrate ER (IMDUR) 120 mg CR tablet Take 120 mg by mouth daily. 1/2 tablet               Follow up Care:    1. Other, MD Luda in 1-2 weeks. Please call to set up an appointment shortly after discharge. Diet:  Cardiac Diet    Disposition:  Home. Advanced Directive:   FULL    DNR      Discharge Exam:  General:  Alert, cooperative, no distress, appears stated age. Lungs:   Clear to auscultation bilaterally. Chest wall:  No tenderness or deformity. Heart:  Regular rate and rhythm, S1, S2 normal, no murmur, click, rub or gallop. Abdomen:   Soft, non-tender. Bowel sounds normal. No masses,  No organomegaly. Extremities: Extremities normal, atraumatic, no cyanosis or edema. Pulses: 2+ and symmetric all extremities. Skin: Skin color, texture, turgor normal. No rashes or lesions   Neurologic: CNII-XII intact.  No gross sensory or motor deficits        CONSULTATIONS: Pulmonary/Intensive care    Significant Diagnostic Studies:   6/19/2022: BUN 22 mg/dL (H; Ref range: 6 - 20 mg/dL); Calcium 9.2 mg/dL (Ref range: 8.5 - 10.1 mg/dL); CO2 25 mmol/L (Ref range: 21 - 32 mmol/L); Creatinine 1.23 mg/dL (Ref range: 0.70 - 1.30 mg/dL); Glucose 125 mg/dL (H; Ref range: 65 - 100 mg/dL); HCT 42.8 % (Ref range: 36.6 - 50.3 %); HGB 14.9 g/dL (Ref range: 12.1 - 17.0 g/dL); Potassium 4.5 mmol/L (Ref range: 3.5 - 5.1 mmol/L); Sodium 132 mmol/L (L; Ref range: 136 - 145 mmol/L)  6/20/2022: BUN 24 mg/dL (H; Ref range: 6 - 20 mg/dL); Calcium 8.7 mg/dL (Ref range: 8.5 - 10.1 mg/dL); CO2 25 mmol/L (Ref range: 21 - 32 mmol/L); Creatinine 0.99 mg/dL (Ref range: 0.70 - 1.30 mg/dL); Glucose 181 mg/dL (H; Ref range: 65 - 100 mg/dL); HCT 40.7 % (Ref range: 36.6 - 50.3 %); HGB 14.1 g/dL (Ref range: 12.1 - 17.0 g/dL); Potassium 4.7 mmol/L (Ref range: 3.5 - 5.1 mmol/L); Sodium 135 mmol/L (L; Ref range: 136 - 145 mmol/L)  No results for input(s): WBC, HGB, HCT, PLT, HGBEXT, HCTEXT, PLTEXT in the last 72 hours. Recent Labs     06/21/22  0335   *   K 5.0      CO2 24   BUN 40*   CREA 1.16   *   CA 9.1     No results for input(s): ALT, AP, TBIL, TBILI, TP, ALB, GLOB, GGT, AML, LPSE in the last 72 hours. No lab exists for component: SGOT, GPT, AMYP, HLPSE  No results for input(s): INR, PTP, APTT, INREXT in the last 72 hours. No results for input(s): FE, TIBC, PSAT, FERR in the last 72 hours. Recent Labs     06/21/22  0340   PH 7.34*   PCO2 47*   PO2 83     No results for input(s): CPK, CKMB in the last 72 hours.     No lab exists for component: TROPONINI  No results found for: [facility]    Discharge time spent 35 minutes    Signed:  Leonila Huerta MD  6/23/2022  11:03 AM

## 2022-06-23 NOTE — PROGRESS NOTES
Hospitalist Progress Note               Daily Progress Note: 6/22/2022      Subjective:   Hospital course to date:  Patient is a 64-year-old male with a history of COPD, CAD and hypertension who presented to the ED on the evening of 6/19 with shortness of breath and cough x1 week. Upon arrival to the ED he was in respiratory distress and started on noninvasive ventilation ABG did not show significant hypercapnia. Chest x-ray was clear. Patient was admitted to the ICU for COPD exacerbation, continued on noninvasive ventilation    [facility]  ---------------------------    S: Feeling, breathing better today.  No pain, n/v.      Problem List:  Problem List as of 6/22/2022 Never Reviewed          Codes Class Noted - Resolved    COPD exacerbation ([facility] ICD-10-CM: J44.1  ICD-9-CM: 491.21  6/19/2022 - Present        Chest pain ICD-10-CM: R07.9  ICD-9-CM: 786.50  11/12/2021 - Present        CAD (coronary artery disease) ICD-10-CM: I25.10  ICD-9-CM: 414.00  11/12/2021 - Present              Medications reviewed  Current Facility-Administered Medications   Medication Dose Route Frequency    [START ON 6/23/2022] azithromycin (ZITHROMAX) tablet 500 mg  500 mg Oral DAILY    albuterol (PROVENTIL HFA, VENTOLIN HFA, PROAIR HFA) inhaler 2 Puff  2 Puff Inhalation Q4H PRN    albuterol-ipratropium (DUO-NEB) 2.5 MG-0.5 MG/3 ML  3 mL Nebulization Q6H RT    LORazepam (ATIVAN) injection 1 mg  1 mg IntraVENous Q8H PRN    predniSONE (DELTASONE) tablet 20 mg  20 mg Oral BID WITH MEALS    allopurinoL (ZYLOPRIM) tablet 100 mg  100 mg Oral DAILY    aspirin delayed-release tablet 81 mg  81 mg Oral DAILY    atorvastatin (LIPITOR) tablet 20 mg  20 mg Oral DAILY    budesonide-formoterol (SYMBICORT) 80-4.5 mcg inhaler  2 Puff Inhalation DAILY    clopidogreL (PLAVIX) tablet 75 mg  75 mg Oral DAILY    isosorbide mononitrate ER (IMDUR) tablet 60 mg  60 mg Oral DAILY    losartan (COZAAR) tablet 50 mg  50 mg Oral DAILY    metoprolol succinate (TOPROL-XL) XL tablet 50 mg  50 mg Oral DAILY    spironolactone (ALDACTONE) tablet 25 mg  25 mg Oral DAILY    sodium chloride (NS) flush 5-40 mL  5-40 mL IntraVENous Q8H    sodium chloride (NS) flush 5-40 mL  5-40 mL IntraVENous PRN    acetaminophen (TYLENOL) tablet 650 mg  650 mg Oral Q6H PRN    Or    acetaminophen (TYLENOL) suppository 650 mg  650 mg Rectal Q6H PRN    polyethylene glycol (MIRALAX) packet 17 g  17 g Oral DAILY PRN    ondansetron (ZOFRAN ODT) tablet 4 mg  4 mg Oral Q8H PRN    Or    ondansetron (ZOFRAN) injection 4 mg  4 mg IntraVENous Q6H PRN    enoxaparin (LOVENOX) injection 40 mg  40 mg SubCUTAneous DAILY       Review of Systems:   A comprehensive review of systems was negative except for that written in the HPI. Objective:   Physical Exam:     Visit Vitals  /64   Pulse (!) 58   Temp 97.6 °F (36.4 °C)   Resp 18   Ht 5' 4\" (1.626 m)   Wt 67.1 kg (148 lb)   SpO2 95%   BMI 25.40 kg/m²    O2 Flow Rate (L/min): 2 l/min O2 Device: Nasal cannula    Temp (24hrs), Av.9 °F (36.6 °C), Min:97.6 °F (36.4 °C), Max:98.2 °F (36.8 °C)    No intake/output data recorded. No intake/output data recorded. General:   Awake and alert   Lungs:    Good bilateral breath sounds   Chest wall:  No tenderness or deformity. Heart:  Regular rate and rhythm, S1, S2 normal, no murmur, click, rub or gallop. Abdomen:   Soft, non-tender. Bowel sounds normal. No masses,  No organomegaly. Extremities: Extremities normal, atraumatic, no cyanosis or edema. Pulses: 2+ and symmetric all extremities. Skin: Skin color, texture, turgor normal. No rashes or lesions   Neurologic: CNII-XII intact.   No gross focal deficits         Data Review:       Recent Days:  Recent Labs     22  0459   WBC 9.5   HGB 14.1   HCT 40.7        Recent Labs     22  0335 22  0459   * 135*   K 5.0 4.7    102   CO2 24 25   * 181*   BUN 40* 24*   CREA 1.16 0.99   CA 9.1 8.7     Recent Labs     06/21/22  0340 06/20/22  0554   PH 7.34* 7.36  7.36   PCO2 47* 43  43   PO2 83 94  93   HCO3 25 24  24   FIO2 36.0 40  40       24 Hour Results:  No results found for this or any previous visit (from the past 24 hour(s)). XR CHEST PORT   Final Result      No acute abnormality.            Assessment:  Acute exacerbation of COPD, slowly improving    Acute respiratory failure with hypoxia, improved, now off noninvasive ventilation    Obstructive sleep apnea    History of lung nodules, details unclear    Anxiety and PTSD    Essential hypertension    [facility]        Plan: DC tomorrow if continued improvement    Mustapha Klein MD

## 2022-06-23 NOTE — PROGRESS NOTES
Discussed with the patient and all questioned fully answered. Patient peripheral IV sites discontinued, no signs/symptoms of infection noted. Pt being discharged on room air with O2 sat in mid-90s, does not appear to be in respiratory distress. Patient with belongings wheeled down to lobby.

## 2022-06-23 NOTE — PROGRESS NOTES
Pulmonary/ CC follow-up   Mr. Joelle Hammans a 80years old male who has known history of COPD from ongoing chronic smoking, history of coronary artery disease and hypertension. He works as a . He was working until few days ago when he started having symptoms of shortness of breath along with chest tightness and wheezing. This was associated with productive cough. He denies any high-grade fever or chills. Progressively his condition worsen and he had to come to the emergency department. Uses his inhalers at home but lately they have been ineffective. ER he was found to be in severe respiratory distress and had to be placed on BiPAP. Admitting chest x-ray did not show any significant lung consolidation. Subjective:     Patient examined at bedside  Shortness of breath has shown further  improvement. Denied any chest pain. Patient got weaned off of oxygen.       Patient Active Problem List   Diagnosis Code    Chest pain R07.9    CAD (coronary artery disease) I25.10    COPD exacerbation ([facility] J44.1     Past Medical History:   Diagnosis Date    CAD (coronary artery disease)     2 stents    Chest pain     States occasional CP x1 month    Chronic obstructive pulmonary disease (HCC)     History of blood transfusion     States from bleeding in stomach, from Plavix    Hypercholesteremia     Hypertension     Multiple lung nodules     Recent finding, follow up in 6 months    Pancreatitis     History of    PTSD (post-traumatic stress disorder)     Sleep apnea     no longer uses CPAP      Family History   Problem Relation Age of Onset    Diabetes Mother     Diabetes Father     Diabetes Sister     Diabetes Brother     Stroke Brother     Kidney Disease Brother       Social History     Tobacco Use    Smoking status: Current Every Day Smoker    Smokeless tobacco: Never Used    Tobacco comment: Trying to quit, 5-6 cigarettes a day   Substance Use Topics    Alcohol use: Yes     Comment: 2-3 leon daily     Past Surgical History:   Procedure Laterality Date    HX COLONOSCOPY      [place] CARDIAC SURG PROCEDURE UNLIST      Cardiac cath, 2 stents      Prior to Admission medications    Medication Sig Start Date End Date Taking? Authorizing Provider   azithromycin (ZITHROMAX) 500 mg tab Take 1 Tablet by mouth daily for 4 doses. Indications: bacterial infection with chronic bronchitis 6/24/22 6/28/22 Yes Lorrie Thomas MD   predniSONE (DELTASONE) 20 mg tablet Take 1 Tablet by mouth two (2) times daily (with meals) for 3 doses. 6/23/22 6/25/22 Yes Lorrie Thomas MD   aspirin delayed-release 81 mg tablet Take 81 mg by mouth daily. 1/20/22  Yes Provider, Historical   clopidogreL (PLAVIX) 75 mg tab Take 75 mg by mouth daily. 1/20/22  Yes Provider, Historical   fluticasone propion-salmeteroL (ADVAIR/WIXELA) 100-50 mcg/dose diskus inhaler Take 1 Puff by inhalation every twelve (12) hours. 4/28/22  Yes Provider, Historical   albuterol (ProAir HFA) 90 mcg/actuation inhaler Take 2 Puffs by inhalation as needed for Wheezing or Cough. Yes Provider, Historical   budesonide-formoteroL (Symbicort) 80-4.5 mcg/actuation HFAA Take 2 Puffs by inhalation daily. Yes Provider, Historical   tiotropium bromide (Spiriva Respimat) 2.5 mcg/actuation inhaler Take 2 Puffs by inhalation daily. Yes Provider, Historical   metoprolol succinate (TOPROL-XL) 50 mg XL tablet Take 50 mg by mouth daily. Yes Provider, Historical   losartan (COZAAR) 50 mg tablet Take 50 mg by mouth daily. Yes Provider, Historical   isosorbide mononitrate ER (IMDUR) 120 mg CR tablet Take 120 mg by mouth daily. 1/2 tablet   Yes Provider, Historical     No Known Allergies     Review of Systems:    Systems were reviewed. Positive pertinent findings are mentioned above  Rest of the examination review essentially unremarkable. Objective:   Blood pressure 128/69, pulse (!) 59, temperature 97.6 °F (36.4 °C), resp.  rate 20, height 5' 4\" (1.626 m), weight 67.1 kg (148 lb), SpO2 92 %. Temp (24hrs), Av.8 °F (36.6 °C), Min:97.6 °F (36.4 °C), Max:98.2 °F (36.8 °C)    XR CHEST PORT   Final Result      No acute abnormality. Data Review:   Current Facility-Administered Medications   Medication Dose Route Frequency    azithromycin (ZITHROMAX) tablet 500 mg  500 mg Oral DAILY    albuterol (PROVENTIL HFA, VENTOLIN HFA, PROAIR HFA) inhaler 2 Puff  2 Puff Inhalation Q4H PRN    albuterol-ipratropium (DUO-NEB) 2.5 MG-0.5 MG/3 ML  3 mL Nebulization Q6H RT    LORazepam (ATIVAN) injection 1 mg  1 mg IntraVENous Q8H PRN    predniSONE (DELTASONE) tablet 20 mg  20 mg Oral BID WITH MEALS    allopurinoL (ZYLOPRIM) tablet 100 mg  100 mg Oral DAILY    aspirin delayed-release tablet 81 mg  81 mg Oral DAILY    atorvastatin (LIPITOR) tablet 20 mg  20 mg Oral DAILY    budesonide-formoterol (SYMBICORT) 80-4.5 mcg inhaler  2 Puff Inhalation DAILY    clopidogreL (PLAVIX) tablet 75 mg  75 mg Oral DAILY    isosorbide mononitrate ER (IMDUR) tablet 60 mg  60 mg Oral DAILY    losartan (COZAAR) tablet 50 mg  50 mg Oral DAILY    metoprolol succinate (TOPROL-XL) XL tablet 50 mg  50 mg Oral DAILY    spironolactone (ALDACTONE) tablet 25 mg  25 mg Oral DAILY    sodium chloride (NS) flush 5-40 mL  5-40 mL IntraVENous Q8H    sodium chloride (NS) flush 5-40 mL  5-40 mL IntraVENous PRN    acetaminophen (TYLENOL) tablet 650 mg  650 mg Oral Q6H PRN    Or    acetaminophen (TYLENOL) suppository 650 mg  650 mg Rectal Q6H PRN    polyethylene glycol (MIRALAX) packet 17 g  17 g Oral DAILY PRN    ondansetron (ZOFRAN ODT) tablet 4 mg  4 mg Oral Q8H PRN    Or    ondansetron (ZOFRAN) injection 4 mg  4 mg IntraVENous Q6H PRN    enoxaparin (LOVENOX) injection 40 mg  40 mg SubCUTAneous DAILY        Exam:     This is an elderly male who is currently in respiratory distress. He is looking short of breath. He is alert and oriented x3.   Head normocephalic and atraumatic, pupils are unresponsive to light clinic recommended therapy. Neck is supple. No cervical lymphadenopathy. Thyroid not enlarged  Chest: Wheezing has resolved. Heart: S1-S2 normal  Abdomen: Soft, nontender, no visceromegaly  Extremities: No edema, cyanosis or clubbing  Neuro: No focal motor deficit. No results found for this or any previous visit (from the past 24 hour(s)). Impression: This is an elderly male who has known history of advanced COPD from ongoing smoking, history of hypertension, coronary artery disease who is admitted hospital because of acute hypoxic respiratory failure which did not improve with outpatient nebulizers and Advair therapy. He was in severe respiratory distress on admission in the ER and was placed on BiPAP. Plan:   1. Acute hypoxic and hypercapnic respiratory failure:  Is resulted because of acute exacerbation of COPD. Patient's blood gas on BiPAP at 20/5 40% FiO2 showed  7.3 4/47/83/24/96   He was transitioned to nasal cannula oxygen and today he is weaned off of supplemental oxygen. 2.  Acute exacerbation of COPD:  Patient has prolonged expiratory phase of breathing with coarse wheezing. His chest is tight. His chest x-ray did not show any lung consolidation. Continue nebulized albuterol and Atrovent every 2 hours which will be spaced to every 3 hours later today. He got started on systemic steroids with prednisone 20 mg twice daily  Switch to po abx. 3.  Coronary artery disease:  He does have history of coronary artery disease and he follows with his cardiologist Dr. John Jsutice. 4.  Hypertension  He is on his antihypertensive medication  5. Smoking:  He is advised to quit smoking. Counseling is done. Nicotine patch as prescribed. Pt is switched to regular consistency diet. He will be on DVT prophylaxis during his hospital stay  D/w covering nurse. Patient getting discharged to home today.   Will see him in office next week on Wednesday, June 29 at 2 PM    Thanks for involving me in the management of your patient during his hospital stay.       Mitchell Pandey MD  [facility]

## 2022-06-23 NOTE — DISCHARGE INSTRUCTIONS
Patient Education        COPD Exacerbation Plan: Care Instructions  Overview     If you have chronic obstructive pulmonary disease (COPD), your usual shortness of breath could suddenly get worse. You may start coughing more and have more mucus. This flare-up is called a COPD exacerbation (say \"[id number]\"). A lung infection or air pollution could set off an exacerbation. Sometimes it can happen after being around chemicals. Work with your doctor to make a plan for dealing with an exacerbation. You can better manage it if you plan ahead. Follow-up care is a key part of your treatment and safety. Be sure to make and go to all appointments, and call your doctor if you are having problems. It's also a good idea to know your test results and keep a list of the medicines you take. How can you care for yourself at home? During an exacerbation  · Do not panic if you start to have one. Quick treatment may help you prevent serious breathing problems. If you have a COPD exacerbation plan that you developed with your doctor, follow it. · Take your medicines exactly as your doctor tells you.  ? Use your inhaler as directed by your doctor. If your symptoms do not get better after you use your medicine, have someone take you to the emergency room. Call an ambulance if necessary. ? With inhaled medicines, a spacer or a nebulizer may help you get more medicine to your lungs. Ask your doctor or pharmacist how to use them properly. Practice using the spacer in front of a mirror before you have an exacerbation. This may help you get the medicine into your lungs quickly. ? If your doctor has given you steroid pills, take them as directed. ? Your doctor may have given you a prescription for antibiotics, which you can fill if you need it. ? Talk to your doctor if you have any problems with your medicine. And call your doctor if you have to use your antibiotic or steroid pills. Preventing an exacerbation  · Do not smoke. This is the most important step you can take to prevent more damage to your lungs and prevent problems. If you already smoke, it is never too late to stop. If you need help quitting, talk to your doctor about stop-smoking programs and medicines. These may increase your chances of quitting for good. · Take your daily medicines as prescribed. · Avoid colds and other infections. ? Get a flu vaccine each year, as soon as it is available. Ask those you live or work with to do the same, so they will not get the flu and infect you.  ? Get the pneumococcal and whooping cough (pertussis) vaccines. ? Try to stay away from people with colds or the flu. ? Wash your hands often. · Avoid secondhand smoke and air pollution. Try to stay inside with your windows closed when air pollution is bad. · Learn breathing techniques for COPD, such as pursed-lip breathing. These techniques may help you breathe easier during an exacerbation. When should you call for help? Call 911 anytime you think you may need emergency care. For example, call if:    · You have severe trouble breathing.     · You have severe chest pain. Call your doctor now or seek immediate medical care if:    · You have new or worse shortness of breath.     · You develop new chest pain.     · You are coughing more deeply or more often, especially if you notice more mucus or a change in the color of your mucus.     · You cough up blood.     · You have new or increased swelling in your legs or belly.     · You have a fever. Watch closely for changes in your health, and be sure to contact your doctor if:    · You need to use your antibiotic or steroid pills.     · Your symptoms are getting worse. Where can you learn more? Go to http://www.gray.com/  Enter U536 in the search box to learn more about \"COPD Exacerbation Plan: Care Instructions. \"  Current as of: July 6, 2021               Content Version: 13.2  © 7193-1199 [employer]. Care instructions adapted under license by [employer] (which disclaims liability or warranty for this information). If you have questions about a medical condition or this instruction, always ask your healthcare professional. [address] any warranty or liability for your use of this information.

## (undated) DEVICE — CATHETER GUID 6FR L100CM GRN PTFE JR3.5 TRUELUMEN HYBRID

## (undated) DEVICE — BOWL UTIL GRAD 32OZ STRL --

## (undated) DEVICE — CATHETER ETER VASC OD6FR CARD 145DEG PGTL BRAID JL40 JR40 MP

## (undated) DEVICE — 3M™ TEGADERM™ TRANSPARENT FILM DRESSING FRAME STYLE, 1626W, 4 IN X 4-3/4 IN (10 CM X 12 CM), 50/CT 4CT/CASE: Brand: 3M™ TEGADERM™

## (undated) DEVICE — WASTEBAG DRIP/ADAPTER: Brand: MEDLINE INDUSTRIES, INC.

## (undated) DEVICE — DRESSING HEMOSTATIC SFT INTVENT W/O SLT DBL WRP QUIKCLOT LF

## (undated) DEVICE — RUNTHROUGH NS EXTRA FLOPPY PTCA GUIDEWIRE: Brand: RUNTHROUGH

## (undated) DEVICE — RADIFOCUS GLIDEWIRE: Brand: GLIDEWIRE

## (undated) DEVICE — SYRINGE MED 10ML PUR GAM COMPATIBLE POLYCARB FIX M LUER CONN

## (undated) DEVICE — APPLICATOR MEDICATED 10.5 CC SOLUTION SCRB TEAL CHLORAPREP

## (undated) DEVICE — SURGICAL PROCEDURE TRAY CRD CATH 3 PRT

## (undated) DEVICE — Device

## (undated) DEVICE — GUIDEWIRE VASC L150CM DIA0.035IN TIP L3MM PTFE J CRV FIX

## (undated) DEVICE — PINNACLE INTRODUCER SHEATH: Brand: PINNACLE

## (undated) DEVICE — GAUZE,SPONGE,4"X4",16PLY,STRL,LF,10/TRAY: Brand: MEDLINE